# Patient Record
Sex: FEMALE | Race: BLACK OR AFRICAN AMERICAN | NOT HISPANIC OR LATINO | Employment: STUDENT | URBAN - METROPOLITAN AREA
[De-identification: names, ages, dates, MRNs, and addresses within clinical notes are randomized per-mention and may not be internally consistent; named-entity substitution may affect disease eponyms.]

---
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
## 2019-01-22 ENCOUNTER — OFFICE VISIT (OUTPATIENT)
Dept: FAMILY MEDICINE | Facility: CLINIC | Age: 19
End: 2019-01-22
Payer: MEDICAID

## 2019-01-22 VITALS
TEMPERATURE: 98.1 F | DIASTOLIC BLOOD PRESSURE: 70 MMHG | HEART RATE: 91 BPM | WEIGHT: 107 LBS | HEIGHT: 60 IN | BODY MASS INDEX: 21.01 KG/M2 | SYSTOLIC BLOOD PRESSURE: 102 MMHG | OXYGEN SATURATION: 98 %

## 2019-01-22 DIAGNOSIS — E55.9 HYPOVITAMINOSIS D: ICD-10-CM

## 2019-01-22 DIAGNOSIS — R53.83 OTHER FATIGUE: ICD-10-CM

## 2019-01-22 DIAGNOSIS — J02.0 STREPTOCOCCAL PHARYNGITIS: ICD-10-CM

## 2019-01-22 DIAGNOSIS — J02.9 SORE THROAT: Primary | ICD-10-CM

## 2019-01-22 LAB
DEPRECATED S PYO AG THROAT QL EIA: ABNORMAL
ERYTHROCYTE [DISTWIDTH] IN BLOOD BY AUTOMATED COUNT: 13.6 % (ref 10–15)
HCT VFR BLD AUTO: 37.7 % (ref 35–47)
HGB BLD-MCNC: 12.3 G/DL (ref 11.7–15.7)
MCH RBC QN AUTO: 26.5 PG (ref 26.5–33)
MCHC RBC AUTO-ENTMCNC: 32.6 G/DL (ref 31.5–36.5)
MCV RBC AUTO: 81 FL (ref 78–100)
PLATELET # BLD AUTO: 217 10E9/L (ref 150–450)
RBC # BLD AUTO: 4.65 10E12/L (ref 3.8–5.2)
SPECIMEN SOURCE: ABNORMAL
WBC # BLD AUTO: 15 10E9/L (ref 4–11)

## 2019-01-22 PROCEDURE — 80053 COMPREHEN METABOLIC PANEL: CPT | Performed by: FAMILY MEDICINE

## 2019-01-22 PROCEDURE — 82306 VITAMIN D 25 HYDROXY: CPT | Performed by: FAMILY MEDICINE

## 2019-01-22 PROCEDURE — 85027 COMPLETE CBC AUTOMATED: CPT | Performed by: FAMILY MEDICINE

## 2019-01-22 PROCEDURE — 84443 ASSAY THYROID STIM HORMONE: CPT | Performed by: FAMILY MEDICINE

## 2019-01-22 PROCEDURE — 36415 COLL VENOUS BLD VENIPUNCTURE: CPT | Performed by: FAMILY MEDICINE

## 2019-01-22 PROCEDURE — 87880 STREP A ASSAY W/OPTIC: CPT | Performed by: FAMILY MEDICINE

## 2019-01-22 PROCEDURE — 99214 OFFICE O/P EST MOD 30 MIN: CPT | Performed by: FAMILY MEDICINE

## 2019-01-22 RX ORDER — AZITHROMYCIN 250 MG/1
TABLET, FILM COATED ORAL
Qty: 6 TABLET | Refills: 0 | Status: SHIPPED | OUTPATIENT
Start: 2019-01-22 | End: 2019-01-27

## 2019-01-22 RX ORDER — AZITHROMYCIN 250 MG/1
TABLET, FILM COATED ORAL
Qty: 6 TABLET | Refills: 0 | Status: SHIPPED | OUTPATIENT
Start: 2019-01-22 | End: 2019-01-22

## 2019-01-22 ASSESSMENT — MIFFLIN-ST. JEOR: SCORE: 1181.85

## 2019-01-22 NOTE — LETTER
January 28, 2019      Asif Dickinson  [address]  [place] [address]        Dear ,    We are writing to inform you of your test results.    Normal thyroid ( TSH)   Normal  liver function tests, kidney functions, glucose and  electrolytes(various salts in your body)     Vitamin-d level is low. Need to take  supplement ( OTC 3000- 4000 units daily). Recheck in 3-4 months.    Resulted Orders   Rapid strep screen   Result Value Ref Range    Specimen Description Throat     Rapid Strep A Screen (A)      POSITIVE: Group A Streptococcal antigen detected by immunoassay.   Vitamin D Deficiency   Result Value Ref Range    Vitamin D Deficiency screening 9 (L) 20 - 75 ug/L      Comment:      Season, race, dietary intake, and treatment affect the concentration of   25-hydroxy-Vitamin D. Values may decrease during winter months and increase   during summer months. Values 20-29 ug/L may indicate Vitamin D insufficiency   and values <20 ug/L may indicate Vitamin D deficiency.  Vitamin D determination is routinely performed by an immunoassay specific for   25 hydroxyvitamin D3.  If an individual is on vitamin D2 (ergocalciferol)   supplementation, please specify 25 OH vitamin D2 and D3 level determination by   LCMSMS test VITD23.     CBC with platelets   Result Value Ref Range    WBC 15.0 (H) 4.0 - 11.0 10e9/L      Comment:      Results confirmed by repeat test    RBC Count 4.65 3.8 - 5.2 10e12/L    Hemoglobin 12.3 11.7 - 15.7 g/dL    Hematocrit 37.7 35.0 - 47.0 %    MCV 81 78 - 100 fl    MCH 26.5 26.5 - 33.0 pg    MCHC 32.6 31.5 - 36.5 g/dL    RDW 13.6 10.0 - 15.0 %    Platelet Count 217 150 - 450 10e9/L   TSH with free T4 reflex   Result Value Ref Range    TSH 1.23 0.40 - 4.00 mU/L   Comprehensive metabolic panel   Result Value Ref Range    Sodium 136 133 - 144 mmol/L    Potassium 4.1 3.4 - 5.3 mmol/L    Chloride 107 96 - 110 mmol/L    Carbon Dioxide 20 20 - 32 mmol/L    Anion Gap 9 3 - 14 mmol/L     Glucose 79 70 - 99 mg/dL    Urea Nitrogen 16 7 - 30 mg/dL    Creatinine 0.65 0.50 - 1.00 mg/dL    GFR Estimate >90 >60 mL/min/[1.73_m2]      Comment:      Non  GFR Calc  Starting 12/18/2018, serum creatinine based estimated GFR (eGFR) will be   calculated using the Chronic Kidney Disease Epidemiology Collaboration   (CKD-EPI) equation.      GFR Estimate If Black >90 >60 mL/min/[1.73_m2]      Comment:       GFR Calc  Starting 12/18/2018, serum creatinine based estimated GFR (eGFR) will be   calculated using the Chronic Kidney Disease Epidemiology Collaboration   (CKD-EPI) equation.      Calcium 9.1 8.5 - 10.1 mg/dL    Bilirubin Total 0.3 0.2 - 1.3 mg/dL    Albumin 3.9 3.4 - 5.0 g/dL    Protein Total 7.5 6.8 - 8.8 g/dL    Alkaline Phosphatase 103 40 - 150 U/L    ALT 18 0 - 50 U/L    AST 18 0 - 35 U/L       If you have any questions or concerns, please call the clinic at the number listed above.       Sincerely,        Letty Montes MD

## 2019-01-22 NOTE — PROGRESS NOTES
SUBJECTIVE:   Asif Dickinson is a 18 year old female who presents to clinic today for the following health issues:      Acute Illness   Acute illness concerns: Sore throat   Onset: x yesterday     Fever: no , But felt hot     Chills/Sweats: YES    Headache (location?): YES- heavy     Sinus Pressure:no    Conjunctivitis:  no    Ear Pain: no    Rhinorrhea: YES    Congestion: YES    Sore Throat: YES    Difficult to swallow          Cough: no    Wheeze: no     Decreased Appetite: no     Nausea: no     Vomiting: no     Diarrhea:  no     Dysuria/Freq.: no     Fatigue/Achiness: YES    Sick/Strep Exposure: no      Therapies Tried and outcome:       PROBLEMS TO ADD ON.  ..  Want to check if deficient Vit D and Iron, as it has been low. And has  been feeling tired a lot despite getting sleep so just worried and wants labs. No weight change, sleep ok. Feels ok otherwise     Problem list and histories reviewed & adjusted, as indicated.  Additional history: as documented    Patient Active Problem List   Diagnosis     Vision disorder     Back pain     Hypovitaminosis D     Loss of weight     Past Surgical History:   Procedure Laterality Date     NO HISTORY OF SURGERY         Social History     Tobacco Use     Smoking status: Never Smoker     Smokeless tobacco: Never Used   Substance Use Topics     Alcohol use: No     Family History   Problem Relation Age of Onset     Diabetes Father            Reviewed and updated as needed this visit by clinical staff       Reviewed and updated as needed this visit by Provider         ROS:  Constitutional, HEENT, cardiovascular, pulmonary, GI, , musculoskeletal, neuro, skin, endocrine and psych systems are negative, except as otherwise noted.    OBJECTIVE:     /70   Pulse 91   Temp 98.1  F (36.7  C) (Tympanic)   Ht 1.524 m (5')   Wt 48.5 kg (107 lb)   LMP 12/30/2018   SpO2 98%   BMI 20.90 kg/m    Body mass index is 20.9 kg/m .  GENERAL: healthy, alert and no distress  HENT: normal  cephalic/atraumatic, ear canals and TM's normal and oral mucous membranes moist, Throat with mild pharyngeal erythema, no sinus  tenderness  NECK: no adenopathy, no asymmetry, masses, or scars and thyroid normal to palpation  RESP: lungs clear to auscultation - no rales, rhonchi or wheezes  CV: regular rate and rhythm, normal S1 S2, no S3 or S4, no murmur, click or rub, no peripheral edema and peripheral pulses strong  ABDOMEN: soft, nontender, no hepatosplenomegaly, no masses and bowel sounds normal    Diagnostic Test Results:  Strep screen - Positive    ASSESSMENT/PLAN:       (J02.9) Sore throat  (primary encounter diagnosis)  Comment:   Plan: Rapid strep screen            (J02.0) Streptococcal pharyngitis  Comment:   Plan: azithromycin (ZITHROMAX) 250 MG tablet,         DISCONTINUED: azithromycin (ZITHROMAX) 250 MG         tablet            (E55.9) Hypovitaminosis D  Comment:   Plan: Vitamin D Deficiency            (R53.83) Other fatigue  Comment:   Plan: Vitamin D Deficiency, CBC with platelets, TSH         with free T4 reflex, Comprehensive metabolic         panel            Check labs. refill sent.Cares and  treatment discussed follow up if problem   Patient expressed understanding and agreement with treatment plan. All patient's questions were answered, will let me know if has more later.  Medications: Rx's: Reviewed the potential side effects/complications of medications prescribed.     Letty Montes MD  [facility] [place]

## 2019-01-22 NOTE — LETTER
January 23, 2019      Asif Dickinson  [address]  [place] [address]        Dear ,    We are writing to inform you of your test results.    Normal cbc except slightly elevated white blood count,  likely related to your strep infection, so not concerning. Hemoglobin is normal; no anemia .    Resulted Orders   Rapid strep screen   Result Value Ref Range    Specimen Description Throat     Rapid Strep A Screen (A)      POSITIVE: Group A Streptococcal antigen detected by immunoassay.   CBC with platelets   Result Value Ref Range    WBC 15.0 (H) 4.0 - 11.0 10e9/L      Comment:      Results confirmed by repeat test    RBC Count 4.65 3.8 - 5.2 10e12/L    Hemoglobin 12.3 11.7 - 15.7 g/dL    Hematocrit 37.7 35.0 - 47.0 %    MCV 81 78 - 100 fl    MCH 26.5 26.5 - 33.0 pg    MCHC 32.6 31.5 - 36.5 g/dL    RDW 13.6 10.0 - 15.0 %    Platelet Count 217 150 - 450 10e9/L       If you have any questions or concerns, please call the clinic at the number listed above.       Sincerely,        Letty Montes MD

## 2019-01-23 LAB — DEPRECATED CALCIDIOL+CALCIFEROL SERPL-MC: 9 UG/L (ref 20–75)

## 2019-01-24 LAB
ALBUMIN SERPL-MCNC: 3.9 G/DL (ref 3.4–5)
ALP SERPL-CCNC: 103 U/L (ref 40–150)
ALT SERPL W P-5'-P-CCNC: 18 U/L (ref 0–50)
ANION GAP SERPL CALCULATED.3IONS-SCNC: 9 MMOL/L (ref 3–14)
AST SERPL W P-5'-P-CCNC: 18 U/L (ref 0–35)
BILIRUB SERPL-MCNC: 0.3 MG/DL (ref 0.2–1.3)
BUN SERPL-MCNC: 16 MG/DL (ref 7–30)
CALCIUM SERPL-MCNC: 9.1 MG/DL (ref 8.5–10.1)
CHLORIDE SERPL-SCNC: 107 MMOL/L (ref 96–110)
CO2 SERPL-SCNC: 20 MMOL/L (ref 20–32)
CREAT SERPL-MCNC: 0.65 MG/DL (ref 0.5–1)
GFR SERPL CREATININE-BSD FRML MDRD: >90 ML/MIN/{1.73_M2}
GLUCOSE SERPL-MCNC: 79 MG/DL (ref 70–99)
POTASSIUM SERPL-SCNC: 4.1 MMOL/L (ref 3.4–5.3)
PROT SERPL-MCNC: 7.5 G/DL (ref 6.8–8.8)
SODIUM SERPL-SCNC: 136 MMOL/L (ref 133–144)
TSH SERPL DL<=0.005 MIU/L-ACNC: 1.23 MU/L (ref 0.4–4)

## 2020-01-07 ENCOUNTER — OFFICE VISIT (OUTPATIENT)
Dept: FAMILY MEDICINE | Facility: CLINIC | Age: 20
End: 2020-01-07
Payer: MEDICAID

## 2020-01-07 VITALS
DIASTOLIC BLOOD PRESSURE: 70 MMHG | WEIGHT: 102 LBS | SYSTOLIC BLOOD PRESSURE: 120 MMHG | TEMPERATURE: 98.3 F | BODY MASS INDEX: 19.92 KG/M2 | HEART RATE: 87 BPM | OXYGEN SATURATION: 97 %

## 2020-01-07 DIAGNOSIS — J20.9 ACUTE BRONCHITIS WITH SYMPTOMS > 10 DAYS: Primary | ICD-10-CM

## 2020-01-07 DIAGNOSIS — R05.9 COUGH: ICD-10-CM

## 2020-01-07 PROCEDURE — 99213 OFFICE O/P EST LOW 20 MIN: CPT | Performed by: FAMILY MEDICINE

## 2020-01-07 RX ORDER — AZITHROMYCIN 250 MG/1
TABLET, FILM COATED ORAL
Qty: 6 TABLET | Refills: 0 | Status: SHIPPED | OUTPATIENT
Start: 2020-01-07 | End: 2020-02-07

## 2020-01-07 RX ORDER — ALBUTEROL SULFATE 90 UG/1
2 AEROSOL, METERED RESPIRATORY (INHALATION) EVERY 6 HOURS
Qty: 1 INHALER | Refills: 0 | Status: SHIPPED | OUTPATIENT
Start: 2020-01-07 | End: 2020-07-03

## 2020-01-07 NOTE — PROGRESS NOTES
Subjective     Asif Dickinson is a 19 year old female who presents to clinic today for the following health issues:    HPI   RESPIRATORY SYMPTOMS      Duration: 1 week +     Description  Dry cough, worse at night    Severity: moderate    Accompanying signs and symptoms: None    History (predisposing factors):  none    Precipitating or alleviating factors: None    Therapies tried and outcome:  otc cough drops no help          Reviewed and updated as needed this visit by Provider         Review of Systems   ROS COMP: Constitutional, HEENT, cardiovascular, pulmonary, gi and gu systems are negative, except as otherwise noted.      Objective    /70   Pulse 87   Temp 98.3  F (36.8  C) (Tympanic)   Wt 46.3 kg (102 lb)   LMP 12/30/2019 (Exact Date)   SpO2 97%   BMI 19.92 kg/m    Body mass index is 19.92 kg/m .  Physical Exam   GENERAL: healthy, alert and no distress  NECK: no adenopathy, no asymmetry, masses, or scars and thyroid normal to palpation  RESP: lungs clear to auscultation - no rales,mildwheeing noted  CV: regular rate and rhythm, normal S1 S2, no S3 or S4, no murmur, click or rub, no peripheral edema and peripheral pulses strong    Diagnostic Test Results:  Labs reviewed in Epic        Assessment & Plan     1. Acute bronchitis with symptoms > 10 days  Advise azithromycin along with albuterol inhaler.  Follow-up if symptoms are not getting better.  Avoid cold air if possible.  - azithromycin (ZITHROMAX) 250 MG tablet; Two tablets first day, then one tablet daily for four days.  Dispense: 6 tablet; Refill: 0    2. Cough    - albuterol (PROAIR HFA/PROVENTIL HFA/VENTOLIN HFA) 108 (90 Base) MCG/ACT inhaler; Inhale 2 puffs into the lungs every 6 hours  Dispense: 1 Inhaler; Refill: 0       Michi Sharma MD  [facility] [place]

## 2020-02-07 ENCOUNTER — OFFICE VISIT (OUTPATIENT)
Dept: FAMILY MEDICINE | Facility: CLINIC | Age: 20
End: 2020-02-07
Payer: COMMERCIAL

## 2020-02-07 VITALS
BODY MASS INDEX: 19.53 KG/M2 | OXYGEN SATURATION: 100 % | TEMPERATURE: 98.2 F | DIASTOLIC BLOOD PRESSURE: 80 MMHG | HEART RATE: 96 BPM | WEIGHT: 100 LBS | SYSTOLIC BLOOD PRESSURE: 104 MMHG

## 2020-02-07 DIAGNOSIS — R05.8 DRY COUGH: Primary | ICD-10-CM

## 2020-02-07 DIAGNOSIS — J30.2 SEASONAL ALLERGIC RHINITIS, UNSPECIFIED TRIGGER: ICD-10-CM

## 2020-02-07 PROCEDURE — 99213 OFFICE O/P EST LOW 20 MIN: CPT | Performed by: FAMILY MEDICINE

## 2020-02-07 NOTE — PROGRESS NOTES
Subjective     Asif Dickinson is a 19 year old female who presents to clinic today for the following health issues:    HPI   Acute Illness   Acute illness concerns: cough  Onset: 5 weeks, dx with bronchitis    Fever: no    Chills/Sweats: no    Headache (location?): no    Sinus Pressure:no    Conjunctivitis:  no    Ear Pain: no    Rhinorrhea: no    Congestion: no    Sore Throat: no     Cough: YES-dry    Wheeze: no    Decreased Appetite: no    Nausea: no    Vomiting: no    Diarrhea:  no    Dysuria/Freq.: no    Fatigue/Achiness: no    Sick/Strep Exposure: no     Therapies Tried and outcome: albuterol, helps              Reviewed and updated as needed this visit by Provider         Review of Systems   ROS COMP: Constitutional, HEENT, cardiovascular, pulmonary, gi and gu systems are negative, except as otherwise noted.      Objective    /80   Pulse 96   Temp 98.2  F (36.8  C) (Tympanic)   Wt 45.4 kg (100 lb)   LMP 01/31/2020 (Approximate)   SpO2 100%   BMI 19.53 kg/m    Body mass index is 19.53 kg/m .  Physical Exam   GENERAL: healthy, alert and no distress  NECK: no adenopathy, no asymmetry, masses, or scars and thyroid normal to palpation  RESP: lungs clear to auscultation - no rales, rhonchi or wheezes  CV: regular rate and rhythm, normal S1 S2, no S3 or S4, no murmur, click or rub, no peripheral edema and peripheral pulses strong    Diagnostic Test Results:  Labs reviewed in Epic        Assessment & Plan     1. Dry cough  Patient has nonspecific dry cough without any fever or chills.  Most likely she has some allergic component to that cough along with mild intermittent asthma could be a possibility.  She is advised to continue using albuterol inhaler.  I would suggest using Flonase steam inhalation.  Follow-up if any change in symptoms.  No antibiotics necessary.    2. Seasonal allergic rhinitis, unspecified trigger           Michi Sharma MD  [facility]

## 2020-04-18 ENCOUNTER — VIRTUAL VISIT (OUTPATIENT)
Dept: FAMILY MEDICINE | Facility: OTHER | Age: 20
End: 2020-04-18

## 2020-04-19 NOTE — PROGRESS NOTES
"Date: 2020 18:58:53  Clinician: Jane Carpenter  Clinician NPI: [id number]  Patient: Asif Dickinson  Patient : 2000  Patient Address: [address], [place] [address]  Patient Phone: [phone]  Visit Protocol: URI  Patient Summary:  Asif is a 19 year old ( : 2000 ) female who initiated a Visit for COVID-19 (Coronavirus) evaluation and screening. When asked the question \"Please sign me up to receive news, health information and promotions from [facility].\", Asif responded \"No\".    Asif states her symptoms started gradually 3-4 days ago. After her symptoms started, they improved and then got worse again.   Her symptoms consist of diarrhea, a sore throat, a cough, nasal congestion, malaise, myalgia, nausea, and a headache. Asif also feels feverish but was unable to measure her temperature.   Symptom details     Nasal secretions: The color of her mucus is clear and yellow.    Cough: Asif coughs a few times an hour and her cough is not more bothersome at night. Phlegm does not come into her throat when she coughs. She does not believe her cough is caused by post-nasal drip.     Sore throat: Asif reports having mild throat pain (1-3 on a 10 point pain scale), does not have exudate on her tonsils, and can swallow liquids. She is not sure if the lymph nodes in her neck are enlarged. A rash has not appeared on the skin since the sore throat started.     Headache: She states the headache is moderate (4-6 on a 10 point pain scale).      Asif denies having rhinitis, chills, facial pain or pressure, ear pain, vomiting, teeth pain, wheezing, anosmia, and ageusia. She also denies taking antibiotic medication for the symptoms, having a sinus infection within the past year, and having recent facial or sinus surgery in the past 60 days. She is not experiencing dyspnea.   Precipitating events  Asif is not sure if she has been exposed to someone with strep throat. She has not recently been exposed to someone with " influenza. Asif has been in close contact with the following high risk individuals: adults 65 or older, children under the age of 5, and people with asthma, heart disease or diabetes.   Pertinent COVID-19 (Coronavirus) information  Asif has not traveled internationally or to the areas where COVID-19 (Coronavirus) is widespread, including cruise ship travel in the last 14 days before the start of her symptoms.   Asif either works or volunteers as a healthcare worker or a , or works or volunteers in a healthcare facility. 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   Aisf has had a close contact with a laboratory-confirmed COVID-19 patient within 14 days of symptom onset. She also has had a close contact with a suspected COVID-19 patient within 14 days of symptom onset. She was exposed at her work. Additional information about contact with COVID-19 (Coronavirus) patient as reported by the patient (free text): Exposure from a positive tested coworker.   Pertinent medical history  Asif does not get yeast infections when she takes antibiotics.   Asif needs a return to work/school note.   Weight: 100 lbs   Asif does not smoke or use smokeless tobacco.   She denies pregnancy and denies breastfeeding. She has menstruated in the past month.   Weight: 100 lbs    MEDICATIONS: No current medications, ALLERGIES: amoxicillin  Clinician Response:  Dear Asif,   Dear Asif  Your symptoms show that you may have coronavirus (COVID-19). This illness can cause fever, cough and trouble breathing. Many people get a mild case and get better on their own. Some people can get very sick.  Will I be tested for COVID-19?  Because the virus is spreading, we are no longer testing most patients. You may request testing if:   You are very ill. For example, you're on chemotherapy, dialysis  or home hospice care. (Contact your specialty clinic or program.)   You live in a nursing home or other long-term care facility. (Talk to your nurse manager or medical director.)   You're a health care worker. (Contact your employee health office.)   How can I protect others?  Without a test, we can't know for sure that you have COVID-19. For safety, it's very important to follow these rules.  First, stay home and away from others (self-isolate) until:   You've had no fever---and no medicine that reduces fever---for 3 full days (72 hours). And...    Your other symptoms have gotten better. For example, your cough or breathing has improved. And...   At least 7 days have passed since your symptoms started.   During this time:   Don't go to work, school or anywhere else.    Stay away from others in your home. No hugging, kissing or shaking hands.   Don't let anyone visit.   Cover your mouth and nose with a mask, tissue or wash cloth to avoid spreading germs.   Wash your hands and face often. Use soap and water.   How can I take care of myself?   1.Take Tylenol (acetaminophen) for fever or pain. If you have liver or kidney problems, ask your family doctor if it's okay to take Tylenol.        Adults can take either:    650 mg (two 325 mg pills) every 4 to 6 hours, or...   1,000 mg (two 500 mg pills) every 8 hours as needed.    Note: Don't take more than 3,000 mg in one day.   For children, check the Tylenol bottle for the right dose. The dose is based on the child's age or weight.   2.If you have other health problems (like cancer, heart failure, an organ transplant or severe kidney disease): Call your specialty clinic if you don't feel better in the next 2 days.       3.Know when to call 911: If your breathing is so bad that it keeps you from doing normal activities, call 911 or go to the emergency room. Tell them that you've been staying home and may have COVID-19.       4.Sign up for [facility]. We know it's scary to  hear that you might have COVID-19. We want to track your symptoms to make sure you're okay over the next 2 weeks. Please look for an email from [employer]---this is a free, online program that we'll use to keep in touch. To sign up, follow the link in the email. Learn more at [email or web address].   Where can I get more information?  To learn more about COVID-19 and how to care for yourself at home, please visit the CDC website at https://www.cdc.gov/coronavirus/2019-ncov/about/steps-when-sick.html.  For more options for care at [facility], please visit our website at [email or web address]     COVID-19 (Coronavirus) General Information  Because there is currently no vaccine to prevent infection, the best way to protect yourself is to avoid being exposed to this virus. Common symptoms of COVID-19 include but are not limited to fever, cough, and shortness of breath. These symptoms appear 2-14 days after you are exposed to the virus that causes COVID-19. Click here for more information from the CDC on how to protect yourself.  If you are sick with COVID-19 or suspect you are infected with the virus that causes COVID-19, follow the steps here from the CDC to help prevent the disease from spreading to people in your home and community.  Click here for general information from the CDC on testing.  If you develop any of these emergency warning signs for COVID-19, get medical attention immediately:     Trouble breathing    Persistent pain or pressure in the chest    New confusion or inability to arouse    Bluish lips or face      Call your doctor or clinic before going in. Call 911 if you have a medical emergency and notify the  you have or think you may have COVID-19.  For more detailed and up to date information on COVID-19 (Coronavirus), please visit the CDC website.   Diagnosis: Cough  Diagnosis ICD: R05  Additional Clinician Notes: Shay Gold.  With your symptoms and  exposure, you are COVID suspect patient.  At this time [employer] only tests healthcare workers that are [employer] employed.  You may contact your occupational health services and see if they have testing available.  Otherwise you need to assume you have COVID and follow the isolation recommendations.  You should not return to work until your symptoms are resolved.  I am enrolling you and get well loop so you may be monitored for your symptoms.  Take care, Jane Carpenter MD

## 2020-07-03 ENCOUNTER — VIRTUAL VISIT (OUTPATIENT)
Dept: FAMILY MEDICINE | Facility: CLINIC | Age: 20
End: 2020-07-03
Payer: COMMERCIAL

## 2020-07-03 DIAGNOSIS — Z20.822 EXPOSURE TO COVID-19 VIRUS: Primary | ICD-10-CM

## 2020-07-03 PROCEDURE — 99213 OFFICE O/P EST LOW 20 MIN: CPT | Mod: 95 | Performed by: FAMILY MEDICINE

## 2020-07-03 NOTE — PROGRESS NOTES
"Asif Dickinson is a 20 year old female who is being evaluated via a billable telephone visit.      The patient has been notified of following:     \"This telephone visit will be conducted via a call between you and your physician/provider. We have found that certain health care needs can be provided without the need for a physical exam.  This service lets us provide the care you need with a short phone conversation.  If a prescription is necessary we can send it directly to your pharmacy.  If lab work is needed we can place an order for that and you can then stop by our lab to have the test done at a later time.    Telephone visits are billed at different rates depending on your insurance coverage. During this emergency period, for some insurers they may be billed the same as an in-person visit.  Please reach out to your insurance provider with any questions.    If during the course of the call the physician/provider feels a telephone visit is not appropriate, you will not be charged for this service.\"    Patient has given verbal consent for Telephone visit?  Yes    What phone number would you like to be contacted at? [phone]    How would you like to obtain your AVS? Mail a copy    Subjective     Asif Dickinson is a 20 year old female who presents via phone visit today for the following health issues:    HPI    Concern for COVID-19 - was tested Negative June 16th for exposure & symptoms  Has been out of work since June 7th.  Last day of any symptoms was June 23rd  Would like to go back to work - work does require negative test results before she could resume work.  She feels well now.  Denies any symptoms.               Patient Active Problem List   Diagnosis     Vision disorder     Back pain     Hypovitaminosis D     Loss of weight     Past Surgical History:   Procedure Laterality Date     NO HISTORY OF SURGERY         Social History     Tobacco Use     Smoking status: Never Smoker     Smokeless tobacco: Never Used "   Substance Use Topics     Alcohol use: No     Family History   Problem Relation Age of Onset     Diabetes Father          No current outpatient medications on file.     Allergies   Allergen Reactions     Amoxicillin Rash     Oxybenzone Itching and Rash     Paba Derivatives Itching and Rash       Reviewed and updated as needed this visit by Provider         Review of Systems   INTEGUMENTARY/SKIN: NEGATIVE for worrisome rashes, moles or lesions  GI: NEGATIVE for nausea, abdominal pain, heartburn, or change in bowel habits       Objective   Reported vitals:  There were no vitals taken for this visit.   healthy, alert and no distress  PSYCH: Alert and oriented times 3; coherent speech, normal   rate and volume, able to articulate logical thoughts, able   to abstract reason, no tangential thoughts, no hallucinations   or delusions  Her affect is normal  RESP: No cough, no audible wheezing, able to talk in full sentences  Remainder of exam unable to be completed due to telephone visits            Assessment/Plan:  1. Exposure to COVID-19 virus    - Asymptomatic COVID-19 Virus (Coronavirus) by PCR; Future    No follow-ups on file.      Phone call duration:  7 minutes    Kalie Raza MD

## 2020-07-07 ENCOUNTER — COMMUNICATION - HEALTHEAST (OUTPATIENT)
Dept: SCHEDULING | Facility: CLINIC | Age: 20
End: 2020-07-07

## 2020-07-08 DIAGNOSIS — Z20.822 EXPOSURE TO COVID-19 VIRUS: ICD-10-CM

## 2020-07-08 PROCEDURE — 99207 ZZC NO BILLABLE SERVICE THIS VISIT: CPT

## 2020-07-08 PROCEDURE — U0003 INFECTIOUS AGENT DETECTION BY NUCLEIC ACID (DNA OR RNA); SEVERE ACUTE RESPIRATORY SYNDROME CORONAVIRUS 2 (SARS-COV-2) (CORONAVIRUS DISEASE [COVID-19]), AMPLIFIED PROBE TECHNIQUE, MAKING USE OF HIGH THROUGHPUT TECHNOLOGIES AS DESCRIBED BY CMS-2020-01-R: HCPCS | Performed by: FAMILY MEDICINE

## 2020-07-08 NOTE — LETTER
July 12, 2020        Asif Alok  [address]   [place] [address]    This letter provides a written record that you were tested for COVID-19 on  7/8/20      Your result was negative. This means that we didn t find the virus that causes COVID-19 in your sample. A test may show negative when you do actually have the virus. This can happen when the virus is in the early stages of infection, before you feel illness symptoms.    If you have symptoms   Stay home and away from others (self-isolate) until you meet ALL of the guidelines below:    You ve had no fever--and no medicine that reduces fever--for 3 full days (72 hours). And      Your other symptoms have gotten better. For example, your cough or breathing has improved. And     At least 10 days have passed since your symptoms started.    During this time:    Stay home. Don t go to work, school or anywhere else.     Stay in your own room, including for meals. Use your own bathroom if you can.    Stay away from others in your home. No hugging, kissing or shaking hands. No visitors.    Clean  high touch  surfaces often (doorknobs, counters, handles, etc.). Use a household cleaning spray or wipes. You can find a full list on the EPA website at www.epa.gov/pesticide-registration/list-n-disinfectants-use-against-sars-cov-2.    Cover your mouth and nose with a mask, tissue or washcloth to avoid spreading germs.    Wash your hands and face often with soap and water.    Going back to work  Check with your employer for any guidelines to follow for going back to work.    Employers: This document serves as formal notice that your employee tested negative for COVID-19, as of the testing date shown above.

## 2020-07-09 LAB
SARS-COV-2 RNA SPEC QL NAA+PROBE: NOT DETECTED
SPECIMEN SOURCE: NORMAL

## 2020-07-15 ENCOUNTER — OFFICE VISIT (OUTPATIENT)
Dept: FAMILY MEDICINE | Facility: CLINIC | Age: 20
End: 2020-07-15
Payer: COMMERCIAL

## 2020-07-15 VITALS
DIASTOLIC BLOOD PRESSURE: 72 MMHG | SYSTOLIC BLOOD PRESSURE: 110 MMHG | BODY MASS INDEX: 20.03 KG/M2 | HEIGHT: 60 IN | TEMPERATURE: 97.5 F | HEART RATE: 70 BPM | OXYGEN SATURATION: 100 % | WEIGHT: 102 LBS

## 2020-07-15 DIAGNOSIS — R63.4 WEIGHT LOSS: ICD-10-CM

## 2020-07-15 DIAGNOSIS — R79.89 LOW VITAMIN D LEVEL: ICD-10-CM

## 2020-07-15 DIAGNOSIS — Z00.00 ROUTINE GENERAL MEDICAL EXAMINATION AT A HEALTH CARE FACILITY: Primary | ICD-10-CM

## 2020-07-15 DIAGNOSIS — E03.9 HYPOTHYROIDISM, UNSPECIFIED TYPE: ICD-10-CM

## 2020-07-15 PROCEDURE — 80053 COMPREHEN METABOLIC PANEL: CPT | Performed by: FAMILY MEDICINE

## 2020-07-15 PROCEDURE — 82306 VITAMIN D 25 HYDROXY: CPT | Performed by: FAMILY MEDICINE

## 2020-07-15 PROCEDURE — 84439 ASSAY OF FREE THYROXINE: CPT | Performed by: FAMILY MEDICINE

## 2020-07-15 PROCEDURE — 84443 ASSAY THYROID STIM HORMONE: CPT | Performed by: FAMILY MEDICINE

## 2020-07-15 PROCEDURE — 99395 PREV VISIT EST AGE 18-39: CPT | Performed by: FAMILY MEDICINE

## 2020-07-15 PROCEDURE — 36415 COLL VENOUS BLD VENIPUNCTURE: CPT | Performed by: FAMILY MEDICINE

## 2020-07-15 ASSESSMENT — MIFFLIN-ST. JEOR: SCORE: 1146.68

## 2020-07-15 NOTE — PROGRESS NOTES
SUBJECTIVE:   CC: Asif Dickinson is an 20 year old woman who presents for preventive health visit.     Healthy Habits:    Do you get at least three servings of calcium containing foods daily (dairy, green leafy vegetables, etc.)? yes    Amount of exercise or daily activities, outside of work: 1-2 day(s) per week    Problems taking medications regularly not applicable    Medication side effects: Not angel luis     Have you had an eye exam in the past two years? yes    Do you see a dentist twice per year? yes    Do you have sleep apnea, excessive snoring or daytime drowsiness?no    Over all feels good, feels she may not be gaining enough weight, but feels helathy    Today's PHQ-2 Score:   PHQ-2 ( 1999 [employer]) 7/15/2020 7/3/2020   Q1: Little interest or pleasure in doing things 0 0   Q2: Feeling down, depressed or hopeless 0 0   PHQ-2 Score 0 0       Abuse: Current or Past(Physical, Sexual or Emotional)- No  Do you feel safe in your environment? Yes        Social History     Tobacco Use     Smoking status: Never Smoker     Smokeless tobacco: Never Used   Substance Use Topics     Alcohol use: No     If you drink alcohol do you typically have >3 drinks per day or >7 drinks per week? No                     Reviewed orders with patient.  Reviewed health maintenance and updated orders accordingly - Yes  Lab work is in process    Mammogram not appropriate for this patient based on age.    Pertinent mammograms are reviewed under the imaging tab.  History of abnormal Pap smear: NO - age 21-29 PAP every 3 years recommended     Reviewed and updated as needed this visit by clinical staff  Tobacco  Allergies  Meds         Reviewed and updated as needed this visit by Provider            ROS:  CONSTITUTIONAL: NEGATIVE for fever, chills, change in weight  INTEGUMENTARU/SKIN: NEGATIVE for worrisome rashes, moles or lesions  EYES: NEGATIVE for vision changes or irritation  ENT: NEGATIVE for ear, mouth and throat problems  RESP: NEGATIVE for  "significant cough or SOB  BREAST: NEGATIVE for masses, tenderness or discharge  CV: NEGATIVE for chest pain, palpitations or peripheral edema  GI: NEGATIVE for nausea, abdominal pain, heartburn, or change in bowel habits  : NEGATIVE for unusual urinary or vaginal symptoms. Periods are regular.  MUSCULOSKELETAL: NEGATIVE for significant arthralgias or myalgia  NEURO: NEGATIVE for weakness, dizziness or paresthesias  PSYCHIATRIC: NEGATIVE for changes in mood or affect    OBJECTIVE:   /72   Pulse 70   Temp 97.5  F (36.4  C) (Tympanic)   Ht 1.52 m (4' 11.84\")   Wt 46.3 kg (102 lb)   LMP 07/01/2020   SpO2 100%   BMI 20.03 kg/m    EXAM:  GENERAL: healthy, alert and no distress  EYES: Eyes grossly normal to inspection, PERRL and conjunctivae and sclerae normal  HENT: ear canals and TM's normal, nose and mouth without ulcers or lesions  NECK: no adenopathy, no asymmetry, masses, or scars and thyroid normal to palpation  RESP: lungs clear to auscultation - no rales, rhonchi or wheezes  CV: regular rate and rhythm, normal S1 S2, no S3 or S4, no murmur, click or rub, no peripheral edema and peripheral pulses strong  ABDOMEN: soft, nontender, no hepatosplenomegaly, no masses and bowel sounds normal  MS: no gross musculoskeletal defects noted, no edema  SKIN: no suspicious lesions or rashes  NEURO: Normal strength and tone, mentation intact and speech normal  PSYCH: mentation appears normal, affect normal/bright    Diagnostic Test Results:  Labs reviewed in Epic    ASSESSMENT/PLAN:   1. Routine general medical examination at a health care facility    - Comprehensive metabolic panel  - Vitamin D Deficiency  - TSH with free T4 reflex    2. Weight loss  Patient weight reviewed over the years.  She has been maintaining her weight.  As such she is not losing any weight but however she would like to be little more heavier than what she is right now.  She is advised to eat healthy at proper diet plan including carbs and " "protein.  No worrisome symptoms at this time.  - TSH with free T4 reflex    COUNSELING:   Reviewed preventive health counseling, as reflected in patient instructions       Regular exercise       Healthy diet/nutrition    Estimated body mass index is 20.03 kg/m  as calculated from the following:    Height as of this encounter: 1.52 m (4' 11.84\").    Weight as of this encounter: 46.3 kg (102 lb).         reports that she has never smoked. She has never used smokeless tobacco.      Counseling Resources:  ATP IV Guidelines  Pooled Cohorts Equation Calculator  Breast Cancer Risk Calculator  FRAX Risk Assessment  ICSI Preventive Guidelines  Dietary Guidelines for Americans, 2010  USDA's MyPlate  ASA Prophylaxis  Lung CA Screening    Michi Sharma MD  [facility] [place]  "

## 2020-07-16 ENCOUNTER — OFFICE VISIT (OUTPATIENT)
Dept: FAMILY MEDICINE | Facility: CLINIC | Age: 20
End: 2020-07-16
Payer: COMMERCIAL

## 2020-07-16 VITALS — DIASTOLIC BLOOD PRESSURE: 72 MMHG | SYSTOLIC BLOOD PRESSURE: 104 MMHG

## 2020-07-16 DIAGNOSIS — L70.0 ACNE VULGARIS: Primary | ICD-10-CM

## 2020-07-16 DIAGNOSIS — L81.1 MELASMA: ICD-10-CM

## 2020-07-16 DIAGNOSIS — L81.0 POST-INFLAMMATORY HYPERPIGMENTATION: ICD-10-CM

## 2020-07-16 DIAGNOSIS — L85.8 KERATOSIS PILARIS: ICD-10-CM

## 2020-07-16 LAB
ALBUMIN SERPL-MCNC: 4.2 G/DL (ref 3.4–5)
ALP SERPL-CCNC: 90 U/L (ref 40–150)
ALT SERPL W P-5'-P-CCNC: 20 U/L (ref 0–50)
ANION GAP SERPL CALCULATED.3IONS-SCNC: 9 MMOL/L (ref 3–14)
AST SERPL W P-5'-P-CCNC: 15 U/L (ref 0–45)
BILIRUB SERPL-MCNC: 0.3 MG/DL (ref 0.2–1.3)
BUN SERPL-MCNC: 12 MG/DL (ref 7–30)
CALCIUM SERPL-MCNC: 8.8 MG/DL (ref 8.5–10.1)
CHLORIDE SERPL-SCNC: 106 MMOL/L (ref 94–109)
CO2 SERPL-SCNC: 21 MMOL/L (ref 20–32)
CREAT SERPL-MCNC: 0.67 MG/DL (ref 0.52–1.04)
DEPRECATED CALCIDIOL+CALCIFEROL SERPL-MC: 12 UG/L (ref 20–75)
GFR SERPL CREATININE-BSD FRML MDRD: >90 ML/MIN/{1.73_M2}
GLUCOSE SERPL-MCNC: 83 MG/DL (ref 70–99)
POTASSIUM SERPL-SCNC: 3.8 MMOL/L (ref 3.4–5.3)
PROT SERPL-MCNC: 8.5 G/DL (ref 6.8–8.8)
SODIUM SERPL-SCNC: 136 MMOL/L (ref 133–144)
T4 FREE SERPL-MCNC: 1.04 NG/DL (ref 0.76–1.46)
TSH SERPL DL<=0.005 MIU/L-ACNC: 4.24 MU/L (ref 0.4–4)

## 2020-07-16 PROCEDURE — 99214 OFFICE O/P EST MOD 30 MIN: CPT | Performed by: PHYSICIAN ASSISTANT

## 2020-07-16 RX ORDER — TRETINOIN 1 MG/G
CREAM TOPICAL
Qty: 45 G | Refills: 3 | Status: SHIPPED | OUTPATIENT
Start: 2020-07-16 | End: 2021-09-01

## 2020-07-16 RX ORDER — HYDROQUINONE 40 MG/G
CREAM TOPICAL
Qty: 30 G | Refills: 1 | Status: SHIPPED | OUTPATIENT
Start: 2020-07-16 | End: 2021-09-01

## 2020-07-16 RX ORDER — BENZOYL PEROXIDE 100 MG/ML
LIQUID TOPICAL
Qty: 226 G | Refills: 3 | Status: SHIPPED | OUTPATIENT
Start: 2020-07-16 | End: 2021-09-01

## 2020-07-16 NOTE — LETTER
7/16/2020         RE: Asif Dickinson  [address]  [place] [address]        Dear Colleague,    Thank you for referring your patient, Asif Dickinson, to the [facility] [place]. Please see a copy of my visit note below.    HPI:  Asif Dickinson is a 20 year old female patient here today for acne on back .  Patient states this has been present for  awhile.  Patient reports the following symptoms: breakout .  Patient reports the following previous treatments: none.  Patient reports the following modifying factors: none.  Associated symptoms: none. Also has some dark pigment on face. Has not tried anything. Noticed some bumps on arms since she was a child..  Patient has no other skin complaints today.  Remainder of the HPI, Meds, PMH, Allergies, FH, and SH was reviewed in chart.    Pertinent Hx:   Sister has acne  Past Medical History:   Diagnosis Date     Esophageal reflux 2003    evaluated by GI, workup neg       Past Surgical History:   Procedure Laterality Date     NO HISTORY OF SURGERY          Family History   Problem Relation Age of Onset     Diabetes Father        Social History     Socioeconomic History     Marital status: Single     Spouse name: Not on file     Number of children: Not on file     Years of education: Not on file     Highest education level: Not on file   Occupational History     Occupation: [employer]     Employer: [employer]UD[employer]   Social Needs     Financial resource strain: Not on file     Food insecurity     Worry: Not on file     Inability: Not on file     Transportation needs     Medical: Not on file     Non-medical: Not on file   Tobacco Use     Smoking status: Never Smoker     Smokeless tobacco: Never Used   Substance and Sexual Activity     Alcohol use: No     Drug use: No     Sexual activity: Never   Lifestyle     Physical activity     Days per week: Not on file     Minutes per session: Not on file     Stress: Not on file   Relationships     Social connections     Talks on phone:  Not on file     Gets together: Not on file     Attends Muslim service: Not on file     Active member of club or organization: Not on file     Attends meetings of clubs or organizations: Not on file     Relationship status: Not on file     Intimate partner violence     Fear of current or ex partner: Not on file     Emotionally abused: Not on file     Physically abused: Not on file     Forced sexual activity: Not on file   Other Topics Concern     Not on file   Social History Narrative     Not on file       No outpatient encounter medications on file as of 7/16/2020.     No facility-administered encounter medications on file as of 7/16/2020.        Review Of Systems:  Skin: acne, bumps, pigment  Eyes: negative  Ears/Nose/Throat: negative  Respiratory: No shortness of breath, dyspnea on exertion, cough, or hemoptysis  Cardiovascular: negative  Gastrointestinal: negative  Genitourinary: negative  Musculoskeletal: negative  Neurologic: negative  Psychiatric: negative  Hematologic/Lymphatic/Immunologic: negative  Endocrine: negative      Objective:     /72   LMP 07/01/2020   Eyes: Conjunctivae/lids: Normal   ENT: Lips:  Normal  MSK: Normal  Cardiovascular: Peripheral edema none  Pulm: Breathing Normal  Neuro/Psych: Orientation: A/O x 3 Normal; Mood/Affect: Normal, NAD, WDWN  Pt accompanied by: self  Following areas examined: face, UE, back  Vyas skin type:v   Findings:  Flesh colored scaly 1mm papules on lateral arms  Flesh colored papules on back.  Light brown smooth macule on back  Smooth wd brown patchs on chin and forehead  Rough patch on forehead  Assessment and Plan:  1) acne vulgaris, PIH  Wash back once a day in the shower with benzoyl peroxide karolina. Rinse well.   Apply tretinoin to affected area on back in the evening.  Moisturize skin once a day. Tretinoin can be drying    2) melasma  Apply 4% hydroquinone to pigmented areas on face once a day ( can purchase the over the counter 2%  hydroquinone)  Apply tretinoin once a day to rough area on forehead in the evening      3)Keratosis Pilaris:    Is a genetic rash that is considered common. Prevalent on the arms, upper legs, and  cheeks, it looks like small bumps. There is no cure, but there are some topical creams  that will help smooth out the bumps.   Wash body daily with a gentle cleanser( Dove, Cetaphil, or Vanicream) or just friction and water (use soap on underarms, groin, and feet).  Apply a thick emollient at least once a day. If showering apply emollient within 2-3 minutes of showering.   Consider AmLactin or Eucerin Plus twice daily(can purchase over-the-counter) as a moisturizer to affected area.  Begin Tretinoin to affected area nightly. Can be very drying so make sure to moisturize.  Okay to use topical steroid on inflamed areas 2x a day for a short period of time (2-3 days). This can calm down some of the redness.  Side effects of topical steroids including but not limited to atrophy (skin thinning), striae (stretch marks) telangiectasias, steroid acne, and others. Do not apply to normal skin. Do not apply to discolored skin that does not have rash present.     Follow up in 3 months      Again, thank you for allowing me to participate in the care of your patient.        Sincerely,        Raya Wallis PA-C

## 2020-07-16 NOTE — PATIENT INSTRUCTIONS
Proper skin care from [facility]:    -Eliminate harsh soaps as they strip the natural oils from the skin, often resulting in dry itchy skin ( i.e. Dial, Zest, Sasha Spring)  -Use mild soaps such as Cetaphil or Dove Sensitive Skin in the shower. You do not need to use soap on arms, legs, and trunk every time you shower unless visibly soiled.   -Avoid hot or cold showers.  -After showering, lightly dry off and apply moisturizing within 2-3 minutes. This will help trap moisture in the skin.   -Aggressive use of a moisturizer at least 1-2 times a day to the entire body (including -Vanicream, Cetaphil, Aquaphor or Cerave) and moisturize hands after every washing.  -We recommend using moisturizers that come in a tub that needs to be scooped out, not a pump. This has more of an oil base. It will hold moisture in your skin much better than a water base moisturizer. The above recommended are non-pore clogging.      Wear a sunscreen with at least SPF 30 on your face, ears, neck and V of the chest daily. Wear sunscreen on other areas of the body if those areas are exposed to the sun throughout the day. Sunscreens can contain physical and/or chemical blockers. Physical blockers are less likely to clog pores, these include zinc oxide and titanium dioxide. Reapply every two hour and after swimming. Sunscreen examples include Neutrogena, CeraVe, Blue Lizard, Elta MD and many others.    UV radiation  UVA radiation remains constant throughout the day and throughout the year. It is a longer wavelength than UVB and therefore penetrates deeper into the skin leading to immediate and delayed tanning, photoaging, and skin cancer. 70-80% of UVA and UVB radiation occurs between the hours of 10am-2pm.  UVB radiation  UVB radiation causes the most harmful effects and is more significant during the summer months. However, snow and ice can reflect UVB radiation leading to skin damage during the winter months as well. UVB radiation is  responsible for tanning, burning, inflammation, delayed erythema (pinkness), pigmentation (brown spots), and skin cancer.     acne  Wash back once a day in the shower with benzoyl peroxide karolian. Rinse well.   Apply tretinoin to affected area on back in the evening.  Moisturize skin once a day. Tretinoin can be drying    Face  Apply 4% hydroquinone to pigmented areas on face once a day ( can purchase the over the counter 2% hydroquinone)  Apply tretinoin once a day to rough area on forehead in the evening    Arms:  Keratosis Pilaris:    Is a genetic rash that is considered common. Prevalent on the arms, upper legs, and  cheeks, it looks like small bumps. There is no cure, but there are some topical creams  that will help smooth out the bumps.   Wash body daily with a gentle cleanser( Dove, Cetaphil, or Vanicream) or just friction and water (use soap on underarms, groin, and feet).  Apply a thick emollient at least once a day. If showering apply emollient within 2-3 minutes of showering.   Consider AmLactin or Eucerin Plus twice daily(can purchase over-the-counter) as a moisturizer to affected area.  Begin Tretinoin to affected area nightly. Can be very drying so make sure to moisturize.  Okay to use topical steroid on inflamed areas 2x a day for a short period of time (2-3 days). This can calm down some of the redness.  Side effects of topical steroids including but not limited to atrophy (skin thinning), striae (stretch marks) telangiectasias, steroid acne, and others. Do not apply to normal skin. Do not apply to discolored skin that does not have rash present.

## 2020-07-16 NOTE — PROGRESS NOTES
HPI:  Asif Dickinson is a 20 year old female patient here today for acne on back .  Patient states this has been present for  awhile.  Patient reports the following symptoms: breakout .  Patient reports the following previous treatments: none.  Patient reports the following modifying factors: none.  Associated symptoms: none. Also has some dark pigment on face. Has not tried anything. Noticed some bumps on arms since she was a child..  Patient has no other skin complaints today.  Remainder of the HPI, Meds, PMH, Allergies, FH, and SH was reviewed in chart.    Pertinent Hx:   Sister has acne  Past Medical History:   Diagnosis Date     Esophageal reflux 2003    evaluated by GI, workup neg       Past Surgical History:   Procedure Laterality Date     NO HISTORY OF SURGERY          Family History   Problem Relation Age of Onset     Diabetes Father        Social History     Socioeconomic History     Marital status: Single     Spouse name: Not on file     Number of children: Not on file     Years of education: Not on file     Highest education level: Not on file   Occupational History     Occupation: [employer]     Employer: [employer]UDENT   Social Needs     Financial resource strain: Not on file     Food insecurity     Worry: Not on file     Inability: Not on file     Transportation needs     Medical: Not on file     Non-medical: Not on file   Tobacco Use     Smoking status: Never Smoker     Smokeless tobacco: Never Used   Substance and Sexual Activity     Alcohol use: No     Drug use: No     Sexual activity: Never   Lifestyle     Physical activity     Days per week: Not on file     Minutes per session: Not on file     Stress: Not on file   Relationships     Social connections     Talks on phone: Not on file     Gets together: Not on file     Attends Restoration service: Not on file     Active member of club or organization: Not on file     Attends meetings of clubs or organizations: Not on file     Relationship status: Not on file      Intimate partner violence     Fear of current or ex partner: Not on file     Emotionally abused: Not on file     Physically abused: Not on file     Forced sexual activity: Not on file   Other Topics Concern     Not on file   Social History Narrative     Not on file       No outpatient encounter medications on file as of 7/16/2020.     No facility-administered encounter medications on file as of 7/16/2020.        Review Of Systems:  Skin: acne, bumps, pigment  Eyes: negative  Ears/Nose/Throat: negative  Respiratory: No shortness of breath, dyspnea on exertion, cough, or hemoptysis  Cardiovascular: negative  Gastrointestinal: negative  Genitourinary: negative  Musculoskeletal: negative  Neurologic: negative  Psychiatric: negative  Hematologic/Lymphatic/Immunologic: negative  Endocrine: negative      Objective:     /72   LMP 07/01/2020   Eyes: Conjunctivae/lids: Normal   ENT: Lips:  Normal  MSK: Normal  Cardiovascular: Peripheral edema none  Pulm: Breathing Normal  Neuro/Psych: Orientation: A/O x 3 Normal; Mood/Affect: Normal, NAD, WDWN  Pt accompanied by: self  Following areas examined: face, UE, back  Vyas skin type:v   Findings:  Flesh colored scaly 1mm papules on lateral arms  Flesh colored papules on back.  Light brown smooth macule on back  Smooth wd brown patchs on chin and forehead  Rough patch on forehead  Assessment and Plan:  1) acne vulgaris, PIH  Wash back once a day in the shower with benzoyl peroxide karolina. Rinse well.   Apply tretinoin to affected area on back in the evening.  Moisturize skin once a day. Tretinoin can be drying    2) melasma  Apply 4% hydroquinone to pigmented areas on face once a day ( can purchase the over the counter 2% hydroquinone)  Apply tretinoin once a day to rough area on forehead in the evening      3)Keratosis Pilaris:    Is a genetic rash that is considered common. Prevalent on the arms, upper legs, and  cheeks, it looks like small bumps. There is no cure, but  there are some topical creams  that will help smooth out the bumps.   Wash body daily with a gentle cleanser( Dove, Cetaphil, or Vanicream) or just friction and water (use soap on underarms, groin, and feet).  Apply a thick emollient at least once a day. If showering apply emollient within 2-3 minutes of showering.   Consider AmLactin or Eucerin Plus twice daily(can purchase over-the-counter) as a moisturizer to affected area.  Begin Tretinoin to affected area nightly. Can be very drying so make sure to moisturize.  Okay to use topical steroid on inflamed areas 2x a day for a short period of time (2-3 days). This can calm down some of the redness.  Side effects of topical steroids including but not limited to atrophy (skin thinning), striae (stretch marks) telangiectasias, steroid acne, and others. Do not apply to normal skin. Do not apply to discolored skin that does not have rash present.     Follow up in 3 months

## 2020-07-17 ENCOUNTER — MYC MEDICAL ADVICE (OUTPATIENT)
Dept: FAMILY MEDICINE | Facility: CLINIC | Age: 20
End: 2020-07-17

## 2020-07-17 ENCOUNTER — TELEPHONE (OUTPATIENT)
Dept: FAMILY MEDICINE | Facility: CLINIC | Age: 20
End: 2020-07-17

## 2020-07-17 PROBLEM — E03.9 HYPOTHYROIDISM, UNSPECIFIED TYPE: Status: ACTIVE | Noted: 2020-07-17

## 2020-07-17 RX ORDER — LEVOTHYROXINE SODIUM 25 UG/1
25 TABLET ORAL DAILY
Qty: 90 TABLET | Refills: 1 | Status: SHIPPED | OUTPATIENT
Start: 2020-07-17 | End: 2021-01-07

## 2020-07-17 RX ORDER — ERGOCALCIFEROL 1.25 MG/1
50000 CAPSULE, LIQUID FILLED ORAL WEEKLY
Qty: 12 CAPSULE | Refills: 0 | Status: SHIPPED | OUTPATIENT
Start: 2020-07-17 | End: 2020-10-06

## 2020-07-17 NOTE — TELEPHONE ENCOUNTER
TC:   See [employer] message, please mail lab results from 7/15/2020 to patient, RN unable to release results to [employer].    Sharmila Gresham RN, BSN  [facility] [place]

## 2020-07-17 NOTE — TELEPHONE ENCOUNTER
Patient calling clinic again regarding lab results.  Patient was wondering if there was anything she could do at home so she would not have to take a thyroid replacement.  Advised patient no and to sign up for my chart.  Patient will sign up for my chart.    CASS Kate, RN  [employer] [facility]

## 2020-07-17 NOTE — TELEPHONE ENCOUNTER
Mailed to home address.    .Fifi KWAN    [facility] [place]

## 2020-08-07 DIAGNOSIS — Z11.59 SCREENING FOR VIRAL DISEASE: ICD-10-CM

## 2020-11-16 ENCOUNTER — TELEPHONE (OUTPATIENT)
Dept: FAMILY MEDICINE | Facility: CLINIC | Age: 20
End: 2020-11-16

## 2020-11-16 NOTE — TELEPHONE ENCOUNTER
"Returned call to patient.  Damari woke up today sore throat, painful swallowing and states, \"this happens about once a year and I think its strep\".    Denies fever, rash, SOB, difficulty breathing    Advised patient too early to check for strep as she just woke up this morning with a sore throat.  Strongly encouraged patient to gargle with warm salt water, drink warm liquids such as tea, take ibuprofen or tylenol as needed and to continue to monitor symptoms.  Advised patient if symptoms worsen to call clinic, but if viral can take 3-7 days. Patient then stated, \"so I have to wait for it to get worse before I call back\".  Again attempted to educate patient symptoms could be viral an it is too early to assess for strep and antibiotics cannot be given prophylactically and to manage symptoms at home with above recommendations.    ELLYN KateN, RN  [employer] [facility]    "

## 2020-11-16 NOTE — TELEPHONE ENCOUNTER
Symptoms    Describe your symptoms: sore throat, pain when swallowing.    Any pain: Yes: throat    How long have you been having symptoms: patient woke up with it today    Have you been seen for this:  No, but patient states this happens every year and feels the same as before.    Appointment offered?: No    Triage offered?: No    Home remedies tried: none    Requested Pharmacy: n/a    Okay to leave a detailed message? Yes at Cell number on file:    Telephone Information:   Mobile [phone]

## 2020-11-22 ENCOUNTER — HEALTH MAINTENANCE LETTER (OUTPATIENT)
Age: 20
End: 2020-11-22

## 2020-12-04 DIAGNOSIS — R79.89 LOW VITAMIN D LEVEL: ICD-10-CM

## 2020-12-07 RX ORDER — ERGOCALCIFEROL 1.25 MG/1
CAPSULE, LIQUID FILLED ORAL
Qty: 8 CAPSULE | Refills: 0 | Status: SHIPPED | OUTPATIENT
Start: 2020-12-07 | End: 2021-09-01

## 2021-01-14 ENCOUNTER — TELEPHONE (OUTPATIENT)
Dept: FAMILY MEDICINE | Facility: CLINIC | Age: 21
End: 2021-01-14

## 2021-01-14 NOTE — LETTER
January 14, 2021      Asif Dickinson  [address]   [place] [address]        Dear Asif,    I care about your health and have reviewed your health plan. I have reviewed your medical conditions, medication list, and lab results and am making recommendations based on this review, to better manage your health.    You are in particular need of attention regarding:  -Cervical Cancer Screening    I am recommending that you:  -schedule a WELLNESS (Physical) APPOINTMENT with me.   I will check fasting labs the same day - nothing to eat except water and meds for 8-10 hours prior.    Here is a list of Health Maintenance topics that are due now or due soon:  Health Maintenance Due   Topic Date Due     HIV Screening  06/13/2014     Hepatitis C Screening  06/13/2017     PAP Smear  06/13/2020     Flu Vaccine (1) 09/01/2020     PHQ-2  01/01/2021       Please call us at [phone] (or use [employer]) to address the above recommendations.     Thank you for trusting [facility] and we appreciate the opportunity to serve you.  We look forward to supporting your healthcare needs in the future.    Healthy Regards,    Michi Sharma MD

## 2021-01-14 NOTE — TELEPHONE ENCOUNTER
Needs of attention regarding:  -Cervical Cancer Screening    Health Maintenance Topics with due status: Overdue       Topic Date Due    HIV SCREENING 06/13/2014    HEPATITIS C SCREENING 06/13/2017    PAP 06/13/2020    INFLUENZA VACCINE 09/01/2020    PHQ-2 01/01/2021       Communication:  See Letter

## 2021-03-03 ENCOUNTER — OFFICE VISIT (OUTPATIENT)
Dept: FAMILY MEDICINE | Facility: CLINIC | Age: 21
End: 2021-03-03
Payer: COMMERCIAL

## 2021-03-03 VITALS
TEMPERATURE: 97.3 F | HEIGHT: 59 IN | OXYGEN SATURATION: 96 % | DIASTOLIC BLOOD PRESSURE: 70 MMHG | WEIGHT: 100 LBS | BODY MASS INDEX: 20.16 KG/M2 | HEART RATE: 83 BPM | SYSTOLIC BLOOD PRESSURE: 106 MMHG

## 2021-03-03 DIAGNOSIS — E03.9 HYPOTHYROIDISM, UNSPECIFIED TYPE: Primary | ICD-10-CM

## 2021-03-03 DIAGNOSIS — J20.9 ACUTE BRONCHITIS WITH SYMPTOMS > 10 DAYS: ICD-10-CM

## 2021-03-03 LAB
ERYTHROCYTE [DISTWIDTH] IN BLOOD BY AUTOMATED COUNT: 13.4 % (ref 10–15)
HCT VFR BLD AUTO: 39.2 % (ref 35–47)
HGB BLD-MCNC: 13.1 G/DL (ref 11.7–15.7)
MCH RBC QN AUTO: 26.8 PG (ref 26.5–33)
MCHC RBC AUTO-ENTMCNC: 33.4 G/DL (ref 31.5–36.5)
MCV RBC AUTO: 80 FL (ref 78–100)
PLATELET # BLD AUTO: 274 10E9/L (ref 150–450)
RBC # BLD AUTO: 4.88 10E12/L (ref 3.8–5.2)
WBC # BLD AUTO: 6.6 10E9/L (ref 4–11)

## 2021-03-03 PROCEDURE — 85027 COMPLETE CBC AUTOMATED: CPT | Performed by: FAMILY MEDICINE

## 2021-03-03 PROCEDURE — 84443 ASSAY THYROID STIM HORMONE: CPT | Performed by: FAMILY MEDICINE

## 2021-03-03 PROCEDURE — 36415 COLL VENOUS BLD VENIPUNCTURE: CPT | Performed by: FAMILY MEDICINE

## 2021-03-03 PROCEDURE — 99214 OFFICE O/P EST MOD 30 MIN: CPT | Performed by: FAMILY MEDICINE

## 2021-03-03 RX ORDER — AZITHROMYCIN 250 MG/1
TABLET, FILM COATED ORAL
Qty: 6 TABLET | Refills: 0 | Status: SHIPPED | OUTPATIENT
Start: 2021-03-03 | End: 2021-09-01

## 2021-03-03 RX ORDER — PREDNISONE 10 MG/1
10 TABLET ORAL DAILY
Qty: 5 TABLET | Refills: 0 | Status: SHIPPED | OUTPATIENT
Start: 2021-03-03 | End: 2021-03-08

## 2021-03-03 RX ORDER — BENZONATATE 100 MG/1
CAPSULE ORAL
COMMUNITY
Start: 2021-02-18 | End: 2021-03-04

## 2021-03-03 RX ORDER — ALBUTEROL SULFATE 90 UG/1
1-2 AEROSOL, METERED RESPIRATORY (INHALATION)
COMMUNITY
Start: 2021-02-22 | End: 2021-09-01

## 2021-03-03 ASSESSMENT — MIFFLIN-ST. JEOR: SCORE: 1131.35

## 2021-03-03 NOTE — PROGRESS NOTES
Assessment & Plan     Hypothyroidism, unspecified type  Thyroid labs ordered once done we will reviewed and refill her medication if indicated.  - TSH with free T4 reflex    Acute bronchitis with symptoms > 10 days  Patient does not have any fever chills or any body aches no recent sick contact.  She has tried albuterol along with benzoate which have not helped well.  She has some component of mild wheezing with that.  We will try her on low-dose of prednisone along with azithromycin.  She is advised to follow-up if any change in symptom if any fever chills or any other issues she is advised to report back.  - CBC with platelets  - predniSONE (DELTASONE) 10 MG tablet; Take 1 tablet (10 mg) by mouth daily for 5 days  - azithromycin (ZITHROMAX) 250 MG tablet; Two tablets first day, then one tablet daily for four days.                 No follow-ups on file.    Michi Sharma MD  [facility] [place] is a 20 year old who presents for the following health issues    HPI     RESPIRATORY SYMPTOMS  cxr was done which was normal as well    Duration: ongoing for 2 weeks seen at  urgent care and prescribed albuterol and benzonatate  capsule      Description  nasal congestion, cough and wheezing    Severity: moderate    Accompanying signs and symptoms: sob with coughing     History (predisposing factors):  none    Precipitating or alleviating factors: None    Therapies tried and outcome:  Albuterol and benzonatate   No fever chills. No body aches, no sick contact. Had covid testing few weeks sadaf which was negative as per patient..  Hypothyroidism Follow-up      Since last visit, patient describes the following symptoms: Weight stable, no hair loss, no skin changes, no constipation, no loose stools      How many servings of fruits and vegetables do you eat daily?  2-3    On average, how many sweetened beverages do you drink each day (Examples: soda, juice, sweet tea, etc.  Do NOT count  "diet or artificially sweetened beverages)?   0    How many days per week do you exercise enough to make your heart beat faster? 3 or less    How many minutes a day do you exercise enough to make your heart beat faster? 9 or less    How many days per week do you miss taking your medication? 0      Review of Systems   Constitutional, HEENT, cardiovascular, pulmonary, gi and gu systems are negative, except as otherwise noted.      Objective    /70   Pulse 83   Temp 97.3  F (36.3  C) (Tympanic)   Ht 1.51 m (4' 11.45\")   Wt 45.4 kg (100 lb)   LMP 02/22/2021   SpO2 96%   BMI 19.89 kg/m    Body mass index is 19.89 kg/m .  Physical Exam   GENERAL: healthy, alert and no distress  NECK: no adenopathy, no asymmetry, masses, or scars and thyroid normal to palpation  RESP: lungs clear to auscultation - no rales, rhonchi or wheezes and mild expiratory wheezing noted.  CV: regular rate and rhythm, normal S1 S2, no S3 or S4, no murmur, click or rub, no peripheral edema and peripheral pulses strong  ABDOMEN: soft, nontender, no hepatosplenomegaly, no masses and bowel sounds normal        "

## 2021-03-04 LAB — TSH SERPL DL<=0.005 MIU/L-ACNC: 0.91 MU/L (ref 0.4–4)

## 2021-06-09 NOTE — TELEPHONE ENCOUNTER
RN Triage  Asif is calling today to be scheduled for COVID 19 test. Warm transfer to COVID scheduling team.     Mayra Lion RN  [facility] Nurse Advisor    COVID 19 Nurse Triage Plan/Patient Instructions    Please be aware that novel coronavirus (COVID-19) may be circulating in the community. If you develop symptoms such as fever, cough, or SOB or if you have concerns about the presence of another infection including coronavirus (COVID-19), please contact your health care provider or visit [email or web address].     Disposition/Instructions    Patient to stay at home and follow home care protocol based instructions.    Thank you for taking steps to prevent the spread of this virus.  o Limit your contact with others.  o Wear a simple mask to cover your cough.  o Wash your hands well and often.    Resources    [facility]: About COVID-19: [email or web address]    CDC: What to Do If You're Sick: www.cdc.gov/coronavirus/2019-ncov/about/steps-when-sick.html    CDC: Ending Home Isolation: www.cdc.gov/coronavirus/2019-ncov/hcp/disposition-in-home-patients.html     CDC: Caring for Someone: www.cdc.gov/coronavirus/2019-ncov/if-you-are-sick/care-for-someone.html     [facility]: Interim Guidance for Hospital Discharge to Home: www.health.[facility].[place]/diseases/coronavirus/hcp/hospdischarge.pdf    [facility] clinical trials (COVID-19 research studies): [email or web address]-[email or web address]-trials     Below are the COVID-19 hotlines at the [facility] ([facility]). Interpreters are available.   o For health questions: Call 652-107-7390 or 1-761.119.8728 (7 a.m. to 7 p.m.)  o For questions about schools and childcare: Call 409-858-2943 or 1-883.465.5877 (7 a.m. to 7 p.m.)

## 2021-09-01 ENCOUNTER — OFFICE VISIT (OUTPATIENT)
Dept: FAMILY MEDICINE | Facility: CLINIC | Age: 21
End: 2021-09-01
Payer: COMMERCIAL

## 2021-09-01 VITALS
WEIGHT: 102 LBS | OXYGEN SATURATION: 96 % | SYSTOLIC BLOOD PRESSURE: 94 MMHG | HEART RATE: 77 BPM | TEMPERATURE: 97 F | DIASTOLIC BLOOD PRESSURE: 60 MMHG | BODY MASS INDEX: 20.29 KG/M2

## 2021-09-01 DIAGNOSIS — E03.9 ACQUIRED HYPOTHYROIDISM: Primary | ICD-10-CM

## 2021-09-01 DIAGNOSIS — R11.0 POSTPRANDIAL NAUSEA: ICD-10-CM

## 2021-09-01 DIAGNOSIS — E55.9 HYPOVITAMINOSIS D: ICD-10-CM

## 2021-09-01 LAB
BASOPHILS # BLD AUTO: 0 10E3/UL (ref 0–0.2)
BASOPHILS NFR BLD AUTO: 0 %
EOSINOPHIL # BLD AUTO: 0.1 10E3/UL (ref 0–0.7)
EOSINOPHIL NFR BLD AUTO: 1 %
ERYTHROCYTE [DISTWIDTH] IN BLOOD BY AUTOMATED COUNT: 14.3 % (ref 10–15)
HCT VFR BLD AUTO: 39.1 % (ref 35–47)
HGB BLD-MCNC: 12.8 G/DL (ref 11.7–15.7)
LYMPHOCYTES # BLD AUTO: 1.8 10E3/UL (ref 0.8–5.3)
LYMPHOCYTES NFR BLD AUTO: 23 %
MCH RBC QN AUTO: 26.4 PG (ref 26.5–33)
MCHC RBC AUTO-ENTMCNC: 32.7 G/DL (ref 31.5–36.5)
MCV RBC AUTO: 81 FL (ref 78–100)
MONOCYTES # BLD AUTO: 0.4 10E3/UL (ref 0–1.3)
MONOCYTES NFR BLD AUTO: 6 %
NEUTROPHILS # BLD AUTO: 5.5 10E3/UL (ref 1.6–8.3)
NEUTROPHILS NFR BLD AUTO: 70 %
PLATELET # BLD AUTO: 282 10E3/UL (ref 150–450)
RBC # BLD AUTO: 4.85 10E6/UL (ref 3.8–5.2)
WBC # BLD AUTO: 7.9 10E3/UL (ref 4–11)

## 2021-09-01 PROCEDURE — 99214 OFFICE O/P EST MOD 30 MIN: CPT | Performed by: PHYSICIAN ASSISTANT

## 2021-09-01 PROCEDURE — 80050 GENERAL HEALTH PANEL: CPT | Performed by: PHYSICIAN ASSISTANT

## 2021-09-01 PROCEDURE — 36415 COLL VENOUS BLD VENIPUNCTURE: CPT | Performed by: PHYSICIAN ASSISTANT

## 2021-09-01 NOTE — PROGRESS NOTES
Assessment & Plan       ICD-10-CM    1. Acquired hypothyroidism  E03.9 TSH with free T4 reflex     TSH with free T4 reflex   2. Postprandial nausea  R11.0 Helicobacter pylori Antigen Stool     Comprehensive metabolic panel (BMP + Alb, Alk Phos, ALT, AST, Total. Bili, TP)     CBC with platelets and differential     Helicobacter pylori Antigen Stool     Comprehensive metabolic panel (BMP + Alb, Alk Phos, ALT, AST, Total. Bili, TP)     CBC with platelets and differential   3. Hypovitaminosis D  E55.9 Cholecalciferol (VITAMIN D-3) 125 MCG (5000 UT) TABS     - Will recheck TSH to see if levothyroxine indicated, results pending.  - Nausea may be related to thyroid issue, results pending. While patient is thin, proportionally she is at a healthy weight for her height. She has also been maintaining a stable weight despite poor appetite. She may have faster metabolism or need smaller, more frequent meals rather than 2-3 large meals throughout the day; both of which are not of concern and considered normal. Will check H pylori given nausea following meals, as this may be atypical presentation. Will also check nutrition status with CMP and CBC. Known vitamin D deficiency, recommend she resume daily supplement.    Return in about 2 months (around 11/1/2021) for Recheck.    Keila Stein PA-C  [facility] [place] Gold is a 21 year old who presents for the following health issues     HPI     Thyroid Follow-up      Since last visit, patient describes the following symptoms: hair loss and nausea. Has not been on thyroid replacement for 5-6 months.    - Has been off thyroid replacement for past 6 months, notes thinning hair and cold intolerance x2 months, thinks she may need replacement again.  - Frustrated because she cannot gain weight. Reports getting full quite fast and now with nausea after eating any food. Feels like if she forces herself to eat she would throw up.       Review of  Systems   Constitutional, HEENT, cardiovascular, pulmonary, GI, , musculoskeletal, neuro, skin, endocrine and psych systems are negative, except as otherwise noted.      Objective    BP 94/60   Pulse 77   Temp 97  F (36.1  C) (Tympanic)   Wt 46.3 kg (102 lb)   LMP 08/05/2021 (Approximate)   SpO2 96%   BMI 20.29 kg/m    Body mass index is 20.29 kg/m .   Wt Readings from Last 4 Encounters:   09/01/21 46.3 kg (102 lb)   03/03/21 45.4 kg (100 lb)   07/15/20 46.3 kg (102 lb)   02/07/20 45.4 kg (100 lb)     Physical Exam   GENERAL:  WDWN, no acute distress  PSYCH: pleasant, cooperative  EYES: no discharge, no injection  HENT:  Normocephalic. Moist mucus membranes.  NECK:  Supple, symmetric  EXTREMITIES:  No gross deformities, moves all 4 limbs spontaneously, no peripheral edema  SKIN:  Warm and dry, no rash or suspicious lesions    NEUROLOGIC: alert, sensation grossly intact.

## 2021-09-02 LAB
ALBUMIN SERPL-MCNC: 3.7 G/DL (ref 3.4–5)
ALP SERPL-CCNC: 86 U/L (ref 40–150)
ALT SERPL W P-5'-P-CCNC: 19 U/L (ref 0–50)
ANION GAP SERPL CALCULATED.3IONS-SCNC: 6 MMOL/L (ref 3–14)
AST SERPL W P-5'-P-CCNC: 12 U/L (ref 0–45)
BILIRUB SERPL-MCNC: 0.2 MG/DL (ref 0.2–1.3)
BUN SERPL-MCNC: 10 MG/DL (ref 7–30)
CALCIUM SERPL-MCNC: 8.6 MG/DL (ref 8.5–10.1)
CHLORIDE BLD-SCNC: 106 MMOL/L (ref 94–109)
CO2 SERPL-SCNC: 23 MMOL/L (ref 20–32)
CREAT SERPL-MCNC: 0.7 MG/DL (ref 0.52–1.04)
GFR SERPL CREATININE-BSD FRML MDRD: >90 ML/MIN/1.73M2
GLUCOSE BLD-MCNC: 81 MG/DL (ref 70–99)
POTASSIUM BLD-SCNC: 4 MMOL/L (ref 3.4–5.3)
PROT SERPL-MCNC: 7.7 G/DL (ref 6.8–8.8)
SODIUM SERPL-SCNC: 135 MMOL/L (ref 133–144)
TSH SERPL DL<=0.005 MIU/L-ACNC: 1.01 MU/L (ref 0.4–4)

## 2021-09-17 ENCOUNTER — TELEPHONE (OUTPATIENT)
Dept: FAMILY MEDICINE | Facility: CLINIC | Age: 21
End: 2021-09-17

## 2021-09-17 NOTE — TELEPHONE ENCOUNTER
Pt calling states she tested positive for covid by saliva test and wondering how many days she needs to quarantine for?  States she is not having any sxs. Step mom tested positive for covid 3 weeks ago but has not been around her.  Has not been around anyone who has confirmed covid test.  Pt states she had cold sxs start on 9/6 or 9/7 and is not worse.    Advised needs to quarantine for 14 days from testing  Recommend she get a PCR swab test since these are more accurate.    Pt states understanding and will make an appt.    Ann SHELTON RN  EP Triage

## 2021-09-18 ENCOUNTER — HEALTH MAINTENANCE LETTER (OUTPATIENT)
Age: 21
End: 2021-09-18

## 2022-05-27 NOTE — PROGRESS NOTES
SUBJECTIVE:   CC: Asif Dickinson is an 21 year old woman who presents for preventive health visit.       Patient has been advised of split billing requirements and indicates understanding: Yes  Healthy Habits:     Getting at least 3 servings of Calcium per day:  Yes    Bi-annual eye exam:  Yes    Dental care twice a year:  Yes    Sleep apnea or symptoms of sleep apnea:  None    Diet:  Regular (no restrictions)    Frequency of exercise:  2-3 days/week    Duration of exercise:  30-45 minutes    Taking medications regularly:  Not Applicable    Medication side effects:  Not applicable    PHQ-2 Total Score: 0    Additional concerns today:  No  Patient has history of hypothyroidism she stopped taking medication almost a year ago as her numbers were stable.  Denies any new symptoms.  Would like to get it checked.  She will get her Pap smear some other time.      Today's PHQ-2 Score:   PHQ-2 ( 1999 Pfizer) 5/31/2022   Q1: Little interest or pleasure in doing things 0   Q2: Feeling down, depressed or hopeless 0   PHQ-2 Score 0   PHQ-2 Total Score (12-17 Years)- Positive if 3 or more points; Administer PHQ-A if positive -   Q1: Little interest or pleasure in doing things Not at all   Q2: Feeling down, depressed or hopeless Not at all   PHQ-2 Score 0       Abuse: Current or Past (Physical, Sexual or Emotional) - No  Do you feel safe in your environment? Yes    Have you ever done Advance Care Planning? (For example, a Health Directive, POLST, or a discussion with a medical provider or your loved ones about your wishes): No, advance care planning information given to patient to review.  Patient declined advance care planning discussion at this time.    Social History     Tobacco Use     Smoking status: Never Smoker     Smokeless tobacco: Never Used   Substance Use Topics     Alcohol use: No       Alcohol Use 5/31/2022   Prescreen: >3 drinks/day or >7 drinks/week? Not Applicable       Reviewed orders with patient.  Reviewed  "health maintenance and updated orders accordingly - Yes  Lab work is in process    Breast Cancer Screening:        History of abnormal Pap smear: NO - age 21-29 PAP every 3 years recommended     Reviewed and updated as needed this visit by clinical staff   Tobacco                  Reviewed and updated as needed this visit by Provider                       Review of Systems   Constitutional: Negative for chills and fever.   HENT: Negative for congestion, ear pain, hearing loss and sore throat.    Eyes: Negative for pain and visual disturbance.   Respiratory: Negative for cough and shortness of breath.    Cardiovascular: Negative for chest pain, palpitations and peripheral edema.   Gastrointestinal: Negative for abdominal pain, constipation, diarrhea, heartburn, hematochezia and nausea.   Breasts:  Negative for tenderness, breast mass and discharge.   Genitourinary: Negative for dysuria, frequency, genital sores, hematuria, pelvic pain, urgency, vaginal bleeding and vaginal discharge.   Musculoskeletal: Negative for arthralgias, joint swelling and myalgias.   Skin: Negative for rash.   Neurological: Negative for dizziness, weakness, headaches and paresthesias.   Psychiatric/Behavioral: Negative for mood changes. The patient is not nervous/anxious.      OBJECTIVE:   /87   Pulse 87   Temp 97.7  F (36.5  C) (Tympanic)   Ht 1.54 m (5' 0.63\")   Wt 47.6 kg (105 lb)   SpO2 98%   BMI 20.08 kg/m    Physical Exam  GENERAL: healthy, alert and no distress  EYES: Eyes grossly normal to inspection, PERRL and conjunctivae and sclerae normal  HENT: ear canals and TM's normal, nose and mouth without ulcers or lesions  NECK: no adenopathy, no asymmetry, masses, or scars and thyroid normal to palpation  RESP: lungs clear to auscultation - no rales, rhonchi or wheezes  CV: regular rate and rhythm, normal S1 S2, no S3 or S4, no murmur, click or rub, no peripheral edema and peripheral pulses strong  ABDOMEN: soft, nontender, " "no hepatosplenomegaly, no masses and bowel sounds normal  MS: no gross musculoskeletal defects noted, no edema  SKIN: no suspicious lesions or rashes  NEURO: Normal strength and tone, mentation intact and speech normal  PSYCH: mentation appears normal, affect normal/bright    Diagnostic Test Results:  Labs reviewed in Epic    ASSESSMENT/PLAN:   Asif was seen today for physical.    Diagnoses and all orders for this visit:    Acquired hypothyroidism  -     TSH; Future  -     TSH  We will get blood work and follow-up on that if needed we can adjust medications or restart medications.  Currently asymptomatic.  Screening for HIV (human immunodeficiency virus)  -     HIV Antigen Antibody Combo; Future  -     HIV Antigen Antibody Combo    Need for hepatitis C screening test  -     Hepatitis C Screen Reflex to HCV RNA Quant and Genotype; Future  -     Hepatitis C Screen Reflex to HCV RNA Quant and Genotype    Cervical cancer screening    Routine general medical examination at a health care facility  -     HIV Antigen Antibody Combo; Future  -     Hepatitis C Screen Reflex to HCV RNA Quant and Genotype; Future  -     Comprehensive metabolic panel; Future  -     CBC with platelets; Future  -     HIV Antigen Antibody Combo  -     Hepatitis C Screen Reflex to HCV RNA Quant and Genotype  -     Comprehensive metabolic panel  -     CBC with platelets    Vitamin D deficiency  -     Vitamin D Deficiency; Future  -     Vitamin D Deficiency    Other orders  -     REVIEW OF HEALTH MAINTENANCE PROTOCOL ORDERS  -     TDAP VACCINE (Adacel, Boostrix)        Patient has been advised of split billing requirements and indicates understanding: Yes    COUNSELING:  Reviewed preventive health counseling, as reflected in patient instructions       Regular exercise       Healthy diet/nutrition    Estimated body mass index is 20.08 kg/m  as calculated from the following:    Height as of this encounter: 1.54 m (5' 0.63\").    Weight as of this " encounter: 47.6 kg (105 lb).          She reports that she has never smoked. She has never used smokeless tobacco.      Counseling Resources:  ATP IV Guidelines  Pooled Cohorts Equation Calculator  Breast Cancer Risk Calculator  BRCA-Related Cancer Risk Assessment: FHS-7 Tool  FRAX Risk Assessment  ICSI Preventive Guidelines  Dietary Guidelines for Americans, 2010  USDA's MyPlate  ASA Prophylaxis  Lung CA Screening    Michi Sharma MD  [facility]

## 2022-05-31 ENCOUNTER — OFFICE VISIT (OUTPATIENT)
Dept: FAMILY MEDICINE | Facility: CLINIC | Age: 22
End: 2022-05-31
Payer: COMMERCIAL

## 2022-05-31 VITALS
SYSTOLIC BLOOD PRESSURE: 120 MMHG | OXYGEN SATURATION: 98 % | BODY MASS INDEX: 19.83 KG/M2 | HEART RATE: 87 BPM | WEIGHT: 105 LBS | DIASTOLIC BLOOD PRESSURE: 87 MMHG | TEMPERATURE: 97.7 F | HEIGHT: 61 IN

## 2022-05-31 DIAGNOSIS — Z11.4 SCREENING FOR HIV (HUMAN IMMUNODEFICIENCY VIRUS): ICD-10-CM

## 2022-05-31 DIAGNOSIS — Z12.4 CERVICAL CANCER SCREENING: ICD-10-CM

## 2022-05-31 DIAGNOSIS — E55.9 VITAMIN D DEFICIENCY: ICD-10-CM

## 2022-05-31 DIAGNOSIS — Z00.00 ROUTINE GENERAL MEDICAL EXAMINATION AT A HEALTH CARE FACILITY: ICD-10-CM

## 2022-05-31 DIAGNOSIS — Z11.59 NEED FOR HEPATITIS C SCREENING TEST: ICD-10-CM

## 2022-05-31 DIAGNOSIS — E03.9 ACQUIRED HYPOTHYROIDISM: Primary | ICD-10-CM

## 2022-05-31 LAB
ALBUMIN SERPL-MCNC: 3.8 G/DL (ref 3.4–5)
ALP SERPL-CCNC: 94 U/L (ref 40–150)
ALT SERPL W P-5'-P-CCNC: 16 U/L (ref 0–50)
ANION GAP SERPL CALCULATED.3IONS-SCNC: 4 MMOL/L (ref 3–14)
AST SERPL W P-5'-P-CCNC: 13 U/L (ref 0–45)
BILIRUB SERPL-MCNC: 0.2 MG/DL (ref 0.2–1.3)
BUN SERPL-MCNC: 14 MG/DL (ref 7–30)
CALCIUM SERPL-MCNC: 8.8 MG/DL (ref 8.5–10.1)
CHLORIDE BLD-SCNC: 109 MMOL/L (ref 94–109)
CO2 SERPL-SCNC: 24 MMOL/L (ref 20–32)
CREAT SERPL-MCNC: 0.7 MG/DL (ref 0.52–1.04)
DEPRECATED CALCIDIOL+CALCIFEROL SERPL-MC: 10 UG/L (ref 20–75)
ERYTHROCYTE [DISTWIDTH] IN BLOOD BY AUTOMATED COUNT: 14.2 % (ref 10–15)
GFR SERPL CREATININE-BSD FRML MDRD: >90 ML/MIN/1.73M2
GLUCOSE BLD-MCNC: 96 MG/DL (ref 70–99)
HCT VFR BLD AUTO: 36.5 % (ref 35–47)
HCV AB SERPL QL IA: NONREACTIVE
HGB BLD-MCNC: 11.9 G/DL (ref 11.7–15.7)
HIV 1+2 AB+HIV1 P24 AG SERPL QL IA: NONREACTIVE
MCH RBC QN AUTO: 25.5 PG (ref 26.5–33)
MCHC RBC AUTO-ENTMCNC: 32.6 G/DL (ref 31.5–36.5)
MCV RBC AUTO: 78 FL (ref 78–100)
PLATELET # BLD AUTO: 284 10E3/UL (ref 150–450)
POTASSIUM BLD-SCNC: 3.5 MMOL/L (ref 3.4–5.3)
PROT SERPL-MCNC: 7.8 G/DL (ref 6.8–8.8)
RBC # BLD AUTO: 4.66 10E6/UL (ref 3.8–5.2)
SODIUM SERPL-SCNC: 137 MMOL/L (ref 133–144)
TSH SERPL DL<=0.005 MIU/L-ACNC: 2.56 MU/L (ref 0.4–4)
WBC # BLD AUTO: 7.8 10E3/UL (ref 4–11)

## 2022-05-31 PROCEDURE — 86803 HEPATITIS C AB TEST: CPT | Performed by: FAMILY MEDICINE

## 2022-05-31 PROCEDURE — 36415 COLL VENOUS BLD VENIPUNCTURE: CPT | Performed by: FAMILY MEDICINE

## 2022-05-31 PROCEDURE — 85027 COMPLETE CBC AUTOMATED: CPT | Performed by: FAMILY MEDICINE

## 2022-05-31 PROCEDURE — 80053 COMPREHEN METABOLIC PANEL: CPT | Performed by: FAMILY MEDICINE

## 2022-05-31 PROCEDURE — 99395 PREV VISIT EST AGE 18-39: CPT | Mod: 25 | Performed by: FAMILY MEDICINE

## 2022-05-31 PROCEDURE — 84443 ASSAY THYROID STIM HORMONE: CPT | Performed by: FAMILY MEDICINE

## 2022-05-31 PROCEDURE — 90471 IMMUNIZATION ADMIN: CPT | Performed by: FAMILY MEDICINE

## 2022-05-31 PROCEDURE — 90715 TDAP VACCINE 7 YRS/> IM: CPT | Performed by: FAMILY MEDICINE

## 2022-05-31 PROCEDURE — 82306 VITAMIN D 25 HYDROXY: CPT | Performed by: FAMILY MEDICINE

## 2022-05-31 PROCEDURE — 87389 HIV-1 AG W/HIV-1&-2 AB AG IA: CPT | Performed by: FAMILY MEDICINE

## 2022-05-31 ASSESSMENT — ENCOUNTER SYMPTOMS
NERVOUS/ANXIOUS: 0
WEAKNESS: 0
JOINT SWELLING: 0
ARTHRALGIAS: 0
DIARRHEA: 0
COUGH: 0
SORE THROAT: 0
EYE PAIN: 0
PALPITATIONS: 0
FREQUENCY: 0
HEMATOCHEZIA: 0
SHORTNESS OF BREATH: 0
PARESTHESIAS: 0
CONSTIPATION: 0
MYALGIAS: 0
BREAST MASS: 0
HEARTBURN: 0
NAUSEA: 0
CHILLS: 0
HEMATURIA: 0
ABDOMINAL PAIN: 0
DIZZINESS: 0
HEADACHES: 0
FEVER: 0
DYSURIA: 0

## 2022-05-31 ASSESSMENT — PAIN SCALES - GENERAL: PAINLEVEL: MODERATE PAIN (4)

## 2022-06-01 RX ORDER — ERGOCALCIFEROL 1.25 MG/1
50000 CAPSULE, LIQUID FILLED ORAL WEEKLY
Qty: 12 CAPSULE | Refills: 1 | Status: SHIPPED | OUTPATIENT
Start: 2022-06-01

## 2022-11-01 DIAGNOSIS — E55.9 HYPOVITAMINOSIS D: ICD-10-CM

## 2022-11-20 ENCOUNTER — HEALTH MAINTENANCE LETTER (OUTPATIENT)
Age: 22
End: 2022-11-20

## 2023-04-12 ENCOUNTER — OFFICE VISIT (OUTPATIENT)
Dept: FAMILY MEDICINE | Facility: CLINIC | Age: 23
End: 2023-04-12
Payer: COMMERCIAL

## 2023-04-12 DIAGNOSIS — L70.0 ACNE VULGARIS: Primary | ICD-10-CM

## 2023-04-12 PROCEDURE — 99204 OFFICE O/P NEW MOD 45 MIN: CPT | Performed by: NURSE PRACTITIONER

## 2023-04-12 RX ORDER — TRETINOIN 0.25 MG/G
CREAM TOPICAL
Qty: 45 G | Refills: 11 | Status: SHIPPED | OUTPATIENT
Start: 2023-04-12

## 2023-04-12 RX ORDER — CLINDAMYCIN PHOSPHATE 10 UG/ML
LOTION TOPICAL EVERY MORNING
Qty: 60 ML | Refills: 11 | Status: SHIPPED | OUTPATIENT
Start: 2023-04-12

## 2023-04-12 NOTE — LETTER
4/12/2023         RE: Asif Dickinson  [address]  [place] [address]        Dear Colleague,    Thank you for referring your patient, Asif Dickinson, to the [facility] [place]. Please see a copy of my visit note below.    [facility] Dermatology Note  Encounter Date: Apr 12, 2023  Office Visit     Reviewed patients past medical history and pertinent chart review prior to patients visit today.     Dermatology Problem List:  Acne    ____________________________________________    Assessment & Plan:     # Acne vulgaris    -Start clindamycin lotion once daily in the am.  -Start tretinoin 0.025% cream. Apply once every other day and increase to nightly as tolerate.  Waiting a few minutes after washing affected areas will decrease irritation. Method of application, side effects and expected results were discussed. The patient will apply pea size amount to the entire face, avoid areas around the eyes, corners of nose and mouth. Discussed side effects including photosensitivity and irritation.    -Recommend delayed start of benzoyl peroxide 5-10% wash to all areas of acne once skin is tolerating above medications well. Patient counseled medication can bleach towels and clothing.    Follow-up: 3 months for follow up if not well controlled. 1 year if well controlled on topical therapy only, sooner PRN new concerns    Minna Delgado, APRN CNP on 4/12/2023 at 2:12 PM   _______________________________________    CC: Acne (Disccuss acne and acne scars, also facial discoloration)    HPI:  Ms. Asif Dickinson is a(n) 22 year old female who presents today for follow-up  for acne. Patient has acne on face. She has had acne on the back for a few years but recently it developed on her face. She tried an over the counter product, Dr. Estrada, and it helped a lot.     Patient is otherwise feeling well, without additional skin concerns.      Physical Exam:  Vitals: There were no  vitals taken for this visit.  SKIN: Acne exam, which includes the face was performed.  - open and closed comedones scattered on the face  -scattered inflammatory papules and pustules on forehead and chin  -scattered postinflammatory hyperpigmentation on the forehead    - No other lesions of concern on areas examined.     Medications:  Current Outpatient Medications   Medication     cholecalciferol (VITAMIN D3) 125 mcg (5000 units) capsule     vitamin D2 (ERGOCALCIFEROL) 03987 units (1250 mcg) capsule     levothyroxine (SYNTHROID/LEVOTHROID) 25 MCG tablet     No current facility-administered medications for this visit.      Past Medical History:   Patient Active Problem List   Diagnosis     Vision disorder     Back pain     Hypovitaminosis D     Loss of weight     Hypothyroidism, unspecified type     Past Medical History:   Diagnosis Date     Esophageal reflux 2003    evaluated by GI, workup neg       CC Referred Self, MD  No address on file on close of this encounter.       Again, thank you for allowing me to participate in the care of your patient.        Sincerely,        KEVIN Zaldivar CNP

## 2023-04-12 NOTE — PATIENT INSTRUCTIONS
"Acne vulgaris    Morning regimen:    *Wash with either a gentle cleanser such as Cetaphil or Cera Ve unmedicated gentle cleanser. If at any point you are not experiencing dryness or peeling and want more improvement, try adding in a Benzoyl peroxide wash 5%-10%. All of these washes can be purchased over the counter at [employer], [employer], [employer] etc. If you have acne on your chest/back a benzoyl peroxide 10% wash can give you some added benefit if you use it in the shower to the affected areas, and let it sit on the skin for 3-5 minutes before rinsing it off. Benzoyl peroxide can cause dryness so if you're experiencing too much dryness stop the benzoyl peroxide wash and use just a gentle cleanser until dryness subsides. Benzoyl peroxide can also bleach fabrics so you may want to use white towels to avoid bleaching your towels.     *After washing, allow skin to fully dry.    *Then apply clindamycin 1% lotion to the entire face, as well as chest and back if affected.     *Apply a gentle moisturizer with SPF 30+ that says either \"noncomedogenic\" or \"oil free\" meaning it won't clog pores.     Evening regimen:    *Wash with either a gentle cleanser such as Cetaphil gentle cleanser or Benzoyl peroxide wash 5%. (See morning instructions for choosing a wash)    *Allow skin to fully dry again before applying medications. Applying retinods to moist skin will cause added dryness.     *Apply prescription retinoid (tretinoin, adapalene or Differin. Whichever you were prescribed) A pea sized amount will be enough for the entire face. More is not better, it will just add to the dryness. Apply to chest and back areas as well if affected. This is not a spot treatment so make sure you're covering the entire area that is affected by acne. When you're first starting a retinoid you WILL be dry and may have some flaking of the skin. This is an expected response so keep using the medication. If the dryness is not controlled with " moisturizing often, you may want to decrease how often you use it temporarially and slowly increase the frequency to nightly use as the dryness subsides. For most people I would recommend starting use every 3rd night for a few weeks, then increasing to every other night for a few weeks then when you feel like you can tolerate it go up to nightly use. This should help the dryness.     *Apply a gentle moisturizing CREAM. Cera Ve cream, Cetaphil Cream, or Vanicream are good options.         It may take 2-3 months to see 50-70% improvement. It is important to give the topical medications time to clean out your pores and prevent new acne from forming. Sometimes people flare with more acne after starting a new regimen and this is okay. If the flare is severe please call our office and speak with a nurse about your acne.  Otherwise, please continue use of this regimen for the next 3 months and come back for a reevaluation with me and we can adjust your plan at that time.

## 2023-04-12 NOTE — PROGRESS NOTES
[facility] Dermatology Note  Encounter Date: Apr 12, 2023  Office Visit     Reviewed patients past medical history and pertinent chart review prior to patients visit today.     Dermatology Problem List:  Acne    ____________________________________________    Assessment & Plan:     # Acne vulgaris    -Start clindamycin lotion once daily in the am.  -Start tretinoin 0.025% cream. Apply once every other day and increase to nightly as tolerate.  Waiting a few minutes after washing affected areas will decrease irritation. Method of application, side effects and expected results were discussed. The patient will apply pea size amount to the entire face, avoid areas around the eyes, corners of nose and mouth. Discussed side effects including photosensitivity and irritation.    -Recommend delayed start of benzoyl peroxide 5-10% wash to all areas of acne once skin is tolerating above medications well. Patient counseled medication can bleach towels and clothing.    Follow-up: 3 months for follow up if not well controlled. 1 year if well controlled on topical therapy only, sooner PRN new concerns    Minna Delgado, APRN CNP on 4/12/2023 at 2:12 PM   _______________________________________    CC: Acne (Disccuss acne and acne scars, also facial discoloration)    HPI:  Ms. Asif Dickinson is a(n) 22 year old female who presents today for follow-up  for acne. Patient has acne on face. She has had acne on the back for a few years but recently it developed on her face. She tried an over the counter product, Dr. Estrada, and it helped a lot.     Patient is otherwise feeling well, without additional skin concerns.      Physical Exam:  Vitals: There were no vitals taken for this visit.  SKIN: Acne exam, which includes the face was performed.  - open and closed comedones scattered on the face  -scattered inflammatory papules and pustules on forehead and chin  -scattered postinflammatory hyperpigmentation on the  forehead    - No other lesions of concern on areas examined.     Medications:  Current Outpatient Medications   Medication     cholecalciferol (VITAMIN D3) 125 mcg (5000 units) capsule     vitamin D2 (ERGOCALCIFEROL) 75558 units (1250 mcg) capsule     levothyroxine (SYNTHROID/LEVOTHROID) 25 MCG tablet     No current facility-administered medications for this visit.      Past Medical History:   Patient Active Problem List   Diagnosis     Vision disorder     Back pain     Hypovitaminosis D     Loss of weight     Hypothyroidism, unspecified type     Past Medical History:   Diagnosis Date     Esophageal reflux 2003    evaluated by GI, workup neg       CC Referred Self, MD  No address on file on close of this encounter.

## 2023-07-08 ENCOUNTER — HEALTH MAINTENANCE LETTER (OUTPATIENT)
Age: 23
End: 2023-07-08

## 2023-08-14 ENCOUNTER — LAB REQUISITION (OUTPATIENT)
Dept: LAB | Facility: CLINIC | Age: 23
End: 2023-08-14

## 2023-08-14 DIAGNOSIS — E03.9 HYPOTHYROIDISM, UNSPECIFIED: ICD-10-CM

## 2023-08-14 DIAGNOSIS — Z01.419 ENCOUNTER FOR GYNECOLOGICAL EXAMINATION (GENERAL) (ROUTINE) WITHOUT ABNORMAL FINDINGS: ICD-10-CM

## 2023-08-14 LAB — TSH SERPL DL<=0.005 MIU/L-ACNC: 1.48 UIU/ML (ref 0.3–4.2)

## 2023-08-14 PROCEDURE — 84443 ASSAY THYROID STIM HORMONE: CPT | Performed by: PHYSICIAN ASSISTANT

## 2023-08-14 PROCEDURE — G0145 SCR C/V CYTO,THINLAYER,RESCR: HCPCS | Performed by: PHYSICIAN ASSISTANT

## 2023-08-17 LAB
BKR LAB AP GYN ADEQUACY: NORMAL
BKR LAB AP GYN INTERPRETATION: NORMAL
BKR LAB AP HPV REFLEX: NORMAL
BKR LAB AP LMP: NORMAL
BKR LAB AP PREVIOUS ABNL DX: NORMAL
BKR LAB AP PREVIOUS ABNORMAL: NORMAL
PATH REPORT.COMMENTS IMP SPEC: NORMAL
PATH REPORT.COMMENTS IMP SPEC: NORMAL
PATH REPORT.RELEVANT HX SPEC: NORMAL

## 2023-09-29 ENCOUNTER — OFFICE VISIT (OUTPATIENT)
Dept: FAMILY MEDICINE | Facility: CLINIC | Age: 23
End: 2023-09-29
Payer: COMMERCIAL

## 2023-09-29 VITALS
OXYGEN SATURATION: 99 % | SYSTOLIC BLOOD PRESSURE: 102 MMHG | DIASTOLIC BLOOD PRESSURE: 82 MMHG | RESPIRATION RATE: 14 BRPM | TEMPERATURE: 99.2 F | BODY MASS INDEX: 22.03 KG/M2 | WEIGHT: 112.2 LBS | HEART RATE: 75 BPM | HEIGHT: 60 IN

## 2023-09-29 DIAGNOSIS — R53.83 OTHER FATIGUE: Primary | ICD-10-CM

## 2023-09-29 DIAGNOSIS — E55.9 VITAMIN D DEFICIENCY: ICD-10-CM

## 2023-09-29 DIAGNOSIS — Z00.00 ENCOUNTER FOR ANNUAL PHYSICAL EXAM: ICD-10-CM

## 2023-09-29 LAB
ALBUMIN SERPL BCG-MCNC: 4.3 G/DL (ref 3.5–5.2)
ALP SERPL-CCNC: 84 U/L (ref 35–104)
ALT SERPL W P-5'-P-CCNC: 8 U/L (ref 0–50)
ANION GAP SERPL CALCULATED.3IONS-SCNC: 10 MMOL/L (ref 7–15)
AST SERPL W P-5'-P-CCNC: 18 U/L (ref 0–45)
BILIRUB SERPL-MCNC: 0.2 MG/DL
BUN SERPL-MCNC: 9.4 MG/DL (ref 6–20)
CALCIUM SERPL-MCNC: 9.1 MG/DL (ref 8.6–10)
CHLORIDE SERPL-SCNC: 105 MMOL/L (ref 98–107)
CREAT SERPL-MCNC: 0.64 MG/DL (ref 0.51–0.95)
DEPRECATED HCO3 PLAS-SCNC: 23 MMOL/L (ref 22–29)
EGFRCR SERPLBLD CKD-EPI 2021: >90 ML/MIN/1.73M2
ERYTHROCYTE [DISTWIDTH] IN BLOOD BY AUTOMATED COUNT: 14.2 % (ref 10–15)
GLUCOSE SERPL-MCNC: 86 MG/DL (ref 70–99)
HCT VFR BLD AUTO: 38 % (ref 35–47)
HGB BLD-MCNC: 11.8 G/DL (ref 11.7–15.7)
MCH RBC QN AUTO: 24.1 PG (ref 26.5–33)
MCHC RBC AUTO-ENTMCNC: 31.1 G/DL (ref 31.5–36.5)
MCV RBC AUTO: 78 FL (ref 78–100)
PLATELET # BLD AUTO: 296 10E3/UL (ref 150–450)
POTASSIUM SERPL-SCNC: 4.1 MMOL/L (ref 3.4–5.3)
PROT SERPL-MCNC: 7.4 G/DL (ref 6.4–8.3)
RBC # BLD AUTO: 4.9 10E6/UL (ref 3.8–5.2)
SODIUM SERPL-SCNC: 138 MMOL/L (ref 135–145)
VIT D+METAB SERPL-MCNC: 19 NG/ML (ref 20–50)
WBC # BLD AUTO: 6.4 10E3/UL (ref 4–11)

## 2023-09-29 PROCEDURE — 82306 VITAMIN D 25 HYDROXY: CPT | Performed by: FAMILY MEDICINE

## 2023-09-29 PROCEDURE — 36415 COLL VENOUS BLD VENIPUNCTURE: CPT | Performed by: FAMILY MEDICINE

## 2023-09-29 PROCEDURE — 80053 COMPREHEN METABOLIC PANEL: CPT | Performed by: FAMILY MEDICINE

## 2023-09-29 PROCEDURE — 99395 PREV VISIT EST AGE 18-39: CPT | Performed by: FAMILY MEDICINE

## 2023-09-29 PROCEDURE — 85027 COMPLETE CBC AUTOMATED: CPT | Performed by: FAMILY MEDICINE

## 2023-09-29 ASSESSMENT — ENCOUNTER SYMPTOMS
PARESTHESIAS: 0
DIARRHEA: 0
PALPITATIONS: 0
FEVER: 0
ABDOMINAL PAIN: 0
CHILLS: 0
BREAST MASS: 0
COUGH: 0
NAUSEA: 0
ARTHRALGIAS: 0
DYSURIA: 0
HEMATOCHEZIA: 0
HEARTBURN: 0
CONSTIPATION: 0
HEMATURIA: 0
MYALGIAS: 0
WEAKNESS: 0
NERVOUS/ANXIOUS: 0
SORE THROAT: 0
FREQUENCY: 0
DIZZINESS: 0
JOINT SWELLING: 0
EYE PAIN: 0
HEADACHES: 0
SHORTNESS OF BREATH: 0

## 2023-09-29 ASSESSMENT — PAIN SCALES - GENERAL: PAINLEVEL: NO PAIN (0)

## 2023-09-29 NOTE — PROGRESS NOTES
SUBJECTIVE:   CC: Asif is an 23 year old who presents for preventive health visit.       9/29/2023     1:56 PM   Additional Questions   Roomed by Sherie ROBLES       Healthy Habits:     Getting at least 3 servings of Calcium per day:  Yes    Bi-annual eye exam:  Yes    Dental care twice a year:  Yes    Sleep apnea or symptoms of sleep apnea:  None    Diet:  Regular (no restrictions)    Frequency of exercise:  2-3 days/week    Duration of exercise:  30-45 minutes    Taking medications regularly:  Yes    Medication side effects:  None    Additional concerns today:  Yes      Today's PHQ-2 Score:       9/29/2023     1:48 PM   PHQ-2 ( 1999 [employer])   Q1: Little interest or pleasure in doing things 0   Q2: Feeling down, depressed or hopeless 0   PHQ-2 Score 0   Q1: Little interest or pleasure in doing things Not at all   Q2: Feeling down, depressed or hopeless Not at all   PHQ-2 Score 0               Social History     Tobacco Use    Smoking status: Never    Smokeless tobacco: Never   Substance Use Topics    Alcohol use: No             9/29/2023     1:48 PM   Alcohol Use   Prescreen: >3 drinks/day or >7 drinks/week? Not Applicable     Reviewed orders with patient.  Reviewed health maintenance and updated orders accordingly - Yes  Lab work is in process    Breast Cancer Screening:        History of abnormal Pap smear: NO - age 30- 65 PAP every 3 years recommended      8/14/2023     2:35 PM   PAP / HPV   PAP Negative for Intraepithelial Lesion or Malignancy (NILM)      Reviewed and updated as needed this visit by clinical staff   Tobacco  Allergies  Meds              Reviewed and updated as needed this visit by Provider      Review of Systems   Constitutional:  Negative for chills and fever.   HENT:  Negative for congestion, ear pain, hearing loss and sore throat.    Eyes:  Negative for pain and visual disturbance.   Respiratory:  Negative for cough and shortness of breath.    Cardiovascular:  Negative for chest pain,  "palpitations and peripheral edema.   Gastrointestinal:  Negative for abdominal pain, constipation, diarrhea, heartburn, hematochezia and nausea.   Breasts:  Negative for tenderness, breast mass and discharge.   Genitourinary:  Negative for dysuria, frequency, genital sores, hematuria, pelvic pain, urgency, vaginal bleeding and vaginal discharge.   Musculoskeletal:  Negative for arthralgias, joint swelling and myalgias.   Skin:  Negative for rash.   Neurological:  Negative for dizziness, weakness, headaches and paresthesias.   Psychiatric/Behavioral:  Negative for mood changes. The patient is not nervous/anxious.           OBJECTIVE:   /82   Pulse 75   Temp 99.2  F (37.3  C) (Temporal)   Resp 14   Ht 1.523 m (4' 11.96\")   Wt 50.9 kg (112 lb 3.2 oz)   LMP 09/20/2023 (Within Days)   SpO2 99%   BMI 21.94 kg/m    Physical Exam  GENERAL: healthy, alert and no distress  EYES: Eyes grossly normal to inspection, PERRL and conjunctivae and sclerae normal  HENT: ear canals and TM's normal, nose and mouth without ulcers or lesions  NECK: no adenopathy, no asymmetry, masses, or scars and thyroid normal to palpation  RESP: lungs clear to auscultation - no rales, rhonchi or wheezes  CV: regular rate and rhythm, normal S1 S2, no S3 or S4, no murmur, click or rub, no peripheral edema and peripheral pulses strong  ABDOMEN: soft, nontender, no hepatosplenomegaly, no masses and bowel sounds normal  MS: no gross musculoskeletal defects noted, no edema  SKIN: no suspicious lesions or rashes  NEURO: Normal strength and tone, mentation intact and speech normal  PSYCH: mentation appears normal, affect normal/bright    Diagnostic Test Results:  Labs reviewed in Epic    ASSESSMENT/PLAN:   Asif was seen today for physical and patient request.    Diagnoses and all orders for this visit:    Other fatigue  -     Comprehensive metabolic panel; Future  -     CBC with Platelets; Future  -     Vitamin D Deficiency; Future  -     " Comprehensive metabolic panel  -     CBC with Platelets  -     Vitamin D Deficiency    Vitamin D deficiency  -     Vitamin D Deficiency; Future  -     Vitamin D Deficiency    Encounter for annual physical exam  -     Comprehensive metabolic panel; Future  -     CBC with Platelets; Future  -     Vitamin D Deficiency; Future  -     Comprehensive metabolic panel  -     CBC with Platelets  -     Vitamin D Deficiency    Other orders  -     REVIEW OF HEALTH MAINTENANCE PROTOCOL ORDERS        Patient has been advised of split billing requirements and indicates understanding: Yes      COUNSELING:  Reviewed preventive health counseling, as reflected in patient instructions       Vision screening       Hearing screening        She reports that she has never smoked. She has never used smokeless tobacco.          Michi Sharma MD  [facility] [place]

## 2023-10-03 ENCOUNTER — MYC MEDICAL ADVICE (OUTPATIENT)
Dept: FAMILY MEDICINE | Facility: CLINIC | Age: 23
End: 2023-10-03
Payer: COMMERCIAL

## 2023-10-03 RX ORDER — ERGOCALCIFEROL 1.25 MG/1
50000 CAPSULE, LIQUID FILLED ORAL WEEKLY
Qty: 12 CAPSULE | Refills: 0 | Status: SHIPPED | OUTPATIENT
Start: 2023-10-03 | End: 2024-03-04

## 2023-10-05 NOTE — TELEPHONE ENCOUNTER
Thank you for the response.  MCH and MCHC are the indices of the blood which indicates the size of your red blood cells.  Which are usually not very significant.  Your total hemoglobin and MCV is important.  MCV is on the lower normal side.  If you would like some supplement of iron that would be appropriate.    Michi Sharma MD

## 2023-12-08 ENCOUNTER — IMMUNIZATION (OUTPATIENT)
Dept: FAMILY MEDICINE | Facility: CLINIC | Age: 23
End: 2023-12-08
Payer: COMMERCIAL

## 2023-12-08 DIAGNOSIS — Z23 NEED FOR PROPHYLACTIC VACCINATION AND INOCULATION AGAINST INFLUENZA: Primary | ICD-10-CM

## 2023-12-08 DIAGNOSIS — Z23 HIGH PRIORITY FOR 2019-NCOV VACCINE: ICD-10-CM

## 2023-12-08 PROCEDURE — 99207 PR NO CHARGE NURSE ONLY: CPT

## 2023-12-08 PROCEDURE — 90480 ADMN SARSCOV2 VAC 1/ONLY CMP: CPT

## 2023-12-08 PROCEDURE — 91320 SARSCV2 VAC 30MCG TRS-SUC IM: CPT

## 2024-03-02 DIAGNOSIS — E55.9 VITAMIN D DEFICIENCY: ICD-10-CM

## 2024-03-04 RX ORDER — ERGOCALCIFEROL 1.25 MG/1
50000 CAPSULE, LIQUID FILLED ORAL WEEKLY
Qty: 12 CAPSULE | Refills: 0 | Status: SHIPPED | OUTPATIENT
Start: 2024-03-04

## 2024-08-30 ENCOUNTER — PATIENT OUTREACH (OUTPATIENT)
Dept: CARE COORDINATION | Facility: CLINIC | Age: 24
End: 2024-08-30
Payer: COMMERCIAL

## 2024-09-13 ENCOUNTER — PATIENT OUTREACH (OUTPATIENT)
Dept: CARE COORDINATION | Facility: CLINIC | Age: 24
End: 2024-09-13
Payer: COMMERCIAL

## 2024-11-09 ENCOUNTER — HEALTH MAINTENANCE LETTER (OUTPATIENT)
Age: 24
End: 2024-11-09